# Patient Record
Sex: MALE | Race: WHITE | Employment: OTHER | URBAN - METROPOLITAN AREA
[De-identification: names, ages, dates, MRNs, and addresses within clinical notes are randomized per-mention and may not be internally consistent; named-entity substitution may affect disease eponyms.]

---

## 2018-05-03 ENCOUNTER — HOSPITAL ENCOUNTER (EMERGENCY)
Age: 68
Discharge: HOME | End: 2018-05-03
Attending: EMERGENCY MEDICINE
Payer: MEDICARE

## 2018-05-03 VITALS
OXYGEN SATURATION: 93 % | SYSTOLIC BLOOD PRESSURE: 136 MMHG | HEIGHT: 77 IN | TEMPERATURE: 98 F | DIASTOLIC BLOOD PRESSURE: 78 MMHG | BODY MASS INDEX: 25.98 KG/M2 | RESPIRATION RATE: 14 BRPM | HEART RATE: 106 BPM | WEIGHT: 220 LBS

## 2018-05-03 DIAGNOSIS — S39.012A BACK STRAIN, INITIAL ENCOUNTER: Primary | ICD-10-CM

## 2018-05-03 DIAGNOSIS — M54.32 BILATERAL SCIATICA: ICD-10-CM

## 2018-05-03 DIAGNOSIS — M54.31 BILATERAL SCIATICA: ICD-10-CM

## 2018-05-03 PROCEDURE — 99282 EMERGENCY DEPT VISIT SF MDM: CPT

## 2018-05-03 PROCEDURE — 96372 THER/PROPH/DIAG INJ SC/IM: CPT

## 2018-05-03 PROCEDURE — 6360000002 HC RX W HCPCS: Performed by: EMERGENCY MEDICINE

## 2018-05-03 RX ORDER — ONDANSETRON 4 MG/1
4 TABLET, ORALLY DISINTEGRATING ORAL ONCE
Status: COMPLETED | OUTPATIENT
Start: 2018-05-03 | End: 2018-05-03

## 2018-05-03 RX ADMIN — ONDANSETRON 4 MG: 4 TABLET, ORALLY DISINTEGRATING ORAL at 09:35

## 2018-05-03 RX ADMIN — HYDROMORPHONE HYDROCHLORIDE 1 MG: 1 INJECTION, SOLUTION INTRAMUSCULAR; INTRAVENOUS; SUBCUTANEOUS at 09:35

## 2018-05-03 ASSESSMENT — PAIN DESCRIPTION - ONSET: ONSET: ON-GOING

## 2018-05-03 ASSESSMENT — PAIN DESCRIPTION - ORIENTATION: ORIENTATION: RIGHT;LEFT

## 2018-05-03 ASSESSMENT — PAIN SCALES - GENERAL
PAINLEVEL_OUTOF10: 10
PAINLEVEL_OUTOF10: 10

## 2018-05-03 ASSESSMENT — PAIN DESCRIPTION - FREQUENCY: FREQUENCY: CONTINUOUS

## 2018-05-03 ASSESSMENT — PAIN DESCRIPTION - LOCATION: LOCATION: BACK;LEG

## 2018-05-03 ASSESSMENT — PAIN DESCRIPTION - PAIN TYPE: TYPE: ACUTE PAIN

## 2018-05-03 ASSESSMENT — PAIN DESCRIPTION - PROGRESSION: CLINICAL_PROGRESSION: NOT CHANGED

## 2018-05-03 ASSESSMENT — PAIN DESCRIPTION - DESCRIPTORS: DESCRIPTORS: BURNING

## 2018-05-07 ENCOUNTER — HOSPITAL ENCOUNTER (INPATIENT)
Age: 68
LOS: 1 days | Discharge: HOME | DRG: 300 | End: 2018-05-08
Attending: EMERGENCY MEDICINE | Admitting: INTERNAL MEDICINE
Payer: MEDICARE

## 2018-05-07 ENCOUNTER — APPOINTMENT (OUTPATIENT)
Dept: ULTRASOUND IMAGING | Age: 68
DRG: 300 | End: 2018-05-07
Payer: MEDICARE

## 2018-05-07 DIAGNOSIS — B02.29 POST HERPETIC NEURALGIA: ICD-10-CM

## 2018-05-07 DIAGNOSIS — D64.9 ANEMIA, UNSPECIFIED TYPE: ICD-10-CM

## 2018-05-07 DIAGNOSIS — M79.89 LEG SWELLING: ICD-10-CM

## 2018-05-07 DIAGNOSIS — M54.50 CHRONIC BILATERAL LOW BACK PAIN WITHOUT SCIATICA: ICD-10-CM

## 2018-05-07 DIAGNOSIS — L03.115 BILATERAL LOWER LEG CELLULITIS: Primary | ICD-10-CM

## 2018-05-07 DIAGNOSIS — G89.29 CHRONIC BILATERAL LOW BACK PAIN WITHOUT SCIATICA: ICD-10-CM

## 2018-05-07 DIAGNOSIS — L03.116 BILATERAL LOWER LEG CELLULITIS: Primary | ICD-10-CM

## 2018-05-07 PROBLEM — M50.30 DDD (DEGENERATIVE DISC DISEASE), CERVICAL: Chronic | Status: ACTIVE | Noted: 2018-05-07

## 2018-05-07 PROBLEM — I82.409 DVT OF LEG (DEEP VENOUS THROMBOSIS) (HCC): Status: ACTIVE | Noted: 2018-05-07

## 2018-05-07 LAB
ALBUMIN SERPL-MCNC: 3.5 G/DL (ref 3.5–5.2)
ALP BLD-CCNC: 73 U/L (ref 40–129)
ALT SERPL-CCNC: 13 U/L (ref 0–40)
AMPHETAMINE SCREEN, URINE: NOT DETECTED
ANION GAP SERPL CALCULATED.3IONS-SCNC: 12 MMOL/L (ref 7–16)
APTT: 29.7 SEC (ref 24.5–35.1)
AST SERPL-CCNC: 15 U/L (ref 0–39)
BARBITURATE SCREEN URINE: NOT DETECTED
BASOPHILS ABSOLUTE: 0.02 E9/L (ref 0–0.2)
BASOPHILS RELATIVE PERCENT: 0.3 % (ref 0–2)
BENZODIAZEPINE SCREEN, URINE: NOT DETECTED
BILIRUB SERPL-MCNC: 0.3 MG/DL (ref 0–1.2)
BUN BLDV-MCNC: 21 MG/DL (ref 8–23)
CALCIUM SERPL-MCNC: 9.3 MG/DL (ref 8.6–10.2)
CANNABINOID SCREEN URINE: NOT DETECTED
CHLORIDE BLD-SCNC: 101 MMOL/L (ref 98–107)
CO2: 23 MMOL/L (ref 22–29)
COCAINE METABOLITE SCREEN URINE: NOT DETECTED
CREAT SERPL-MCNC: 0.9 MG/DL (ref 0.7–1.2)
EOSINOPHILS ABSOLUTE: 0.18 E9/L (ref 0.05–0.5)
EOSINOPHILS RELATIVE PERCENT: 2.3 % (ref 0–6)
GFR AFRICAN AMERICAN: >60
GFR NON-AFRICAN AMERICAN: >60 ML/MIN/1.73
GLUCOSE BLD-MCNC: 127 MG/DL (ref 74–109)
HCT VFR BLD CALC: 30.8 % (ref 37–54)
HEMOGLOBIN: 10.4 G/DL (ref 12.5–16.5)
IMMATURE GRANULOCYTES #: 0.03 E9/L
IMMATURE GRANULOCYTES %: 0.4 % (ref 0–5)
INR BLD: 1
LACTIC ACID: 0.8 MMOL/L (ref 0.5–2.2)
LYMPHOCYTES ABSOLUTE: 1.44 E9/L (ref 1.5–4)
LYMPHOCYTES RELATIVE PERCENT: 18.8 % (ref 20–42)
MCH RBC QN AUTO: 33.9 PG (ref 26–35)
MCHC RBC AUTO-ENTMCNC: 33.8 % (ref 32–34.5)
MCV RBC AUTO: 100.3 FL (ref 80–99.9)
METHADONE SCREEN, URINE: NOT DETECTED
MONOCYTES ABSOLUTE: 0.93 E9/L (ref 0.1–0.95)
MONOCYTES RELATIVE PERCENT: 12.1 % (ref 2–12)
NEUTROPHILS ABSOLUTE: 5.06 E9/L (ref 1.8–7.3)
NEUTROPHILS RELATIVE PERCENT: 66.1 % (ref 43–80)
OPIATE SCREEN URINE: NOT DETECTED
PDW BLD-RTO: 12.4 FL (ref 11.5–15)
PHENCYCLIDINE SCREEN URINE: NOT DETECTED
PLATELET # BLD: 224 E9/L (ref 130–450)
PMV BLD AUTO: 9.5 FL (ref 7–12)
POTASSIUM SERPL-SCNC: 4 MMOL/L (ref 3.5–5)
PROPOXYPHENE SCREEN: NOT DETECTED
PROTHROMBIN TIME: 11.3 SEC (ref 9.3–12.4)
RBC # BLD: 3.07 E12/L (ref 3.8–5.8)
SODIUM BLD-SCNC: 136 MMOL/L (ref 132–146)
TOTAL PROTEIN: 6.2 G/DL (ref 6.4–8.3)
WBC # BLD: 7.7 E9/L (ref 4.5–11.5)

## 2018-05-07 PROCEDURE — G8988 SELF CARE GOAL STATUS: HCPCS

## 2018-05-07 PROCEDURE — 6360000002 HC RX W HCPCS: Performed by: EMERGENCY MEDICINE

## 2018-05-07 PROCEDURE — 85610 PROTHROMBIN TIME: CPT

## 2018-05-07 PROCEDURE — 80307 DRUG TEST PRSMV CHEM ANLYZR: CPT

## 2018-05-07 PROCEDURE — 83605 ASSAY OF LACTIC ACID: CPT

## 2018-05-07 PROCEDURE — 6370000000 HC RX 637 (ALT 250 FOR IP): Performed by: INTERNAL MEDICINE

## 2018-05-07 PROCEDURE — 2580000003 HC RX 258: Performed by: EMERGENCY MEDICINE

## 2018-05-07 PROCEDURE — 96366 THER/PROPH/DIAG IV INF ADDON: CPT

## 2018-05-07 PROCEDURE — 96375 TX/PRO/DX INJ NEW DRUG ADDON: CPT

## 2018-05-07 PROCEDURE — 99221 1ST HOSP IP/OBS SF/LOW 40: CPT | Performed by: SURGERY

## 2018-05-07 PROCEDURE — 87040 BLOOD CULTURE FOR BACTERIA: CPT

## 2018-05-07 PROCEDURE — 97161 PT EVAL LOW COMPLEX 20 MIN: CPT

## 2018-05-07 PROCEDURE — G8979 MOBILITY GOAL STATUS: HCPCS

## 2018-05-07 PROCEDURE — G0378 HOSPITAL OBSERVATION PER HR: HCPCS

## 2018-05-07 PROCEDURE — 99285 EMERGENCY DEPT VISIT HI MDM: CPT

## 2018-05-07 PROCEDURE — 2580000003 HC RX 258: Performed by: INTERNAL MEDICINE

## 2018-05-07 PROCEDURE — 93970 EXTREMITY STUDY: CPT

## 2018-05-07 PROCEDURE — 97530 THERAPEUTIC ACTIVITIES: CPT

## 2018-05-07 PROCEDURE — 85730 THROMBOPLASTIN TIME PARTIAL: CPT

## 2018-05-07 PROCEDURE — 80053 COMPREHEN METABOLIC PANEL: CPT

## 2018-05-07 PROCEDURE — 97165 OT EVAL LOW COMPLEX 30 MIN: CPT

## 2018-05-07 PROCEDURE — 96372 THER/PROPH/DIAG INJ SC/IM: CPT

## 2018-05-07 PROCEDURE — G8978 MOBILITY CURRENT STATUS: HCPCS

## 2018-05-07 PROCEDURE — 1200000000 HC SEMI PRIVATE

## 2018-05-07 PROCEDURE — 96365 THER/PROPH/DIAG IV INF INIT: CPT

## 2018-05-07 PROCEDURE — G8987 SELF CARE CURRENT STATUS: HCPCS

## 2018-05-07 PROCEDURE — 85025 COMPLETE CBC W/AUTO DIFF WBC: CPT

## 2018-05-07 PROCEDURE — 6360000002 HC RX W HCPCS: Performed by: INTERNAL MEDICINE

## 2018-05-07 RX ORDER — FENTANYL CITRATE 50 UG/ML
25 INJECTION, SOLUTION INTRAMUSCULAR; INTRAVENOUS ONCE
Status: COMPLETED | OUTPATIENT
Start: 2018-05-07 | End: 2018-05-07

## 2018-05-07 RX ORDER — GABAPENTIN 300 MG/1
300 CAPSULE ORAL 2 TIMES DAILY
Status: DISCONTINUED | OUTPATIENT
Start: 2018-05-07 | End: 2018-05-08 | Stop reason: HOSPADM

## 2018-05-07 RX ORDER — TRAMADOL HYDROCHLORIDE 50 MG/1
50 TABLET ORAL EVERY 6 HOURS PRN
Status: DISCONTINUED | OUTPATIENT
Start: 2018-05-07 | End: 2018-05-08 | Stop reason: HOSPADM

## 2018-05-07 RX ORDER — ACETAMINOPHEN 325 MG/1
650 TABLET ORAL EVERY 4 HOURS PRN
Status: DISCONTINUED | OUTPATIENT
Start: 2018-05-07 | End: 2018-05-08 | Stop reason: HOSPADM

## 2018-05-07 RX ORDER — FUROSEMIDE 20 MG/1
20 TABLET ORAL DAILY
COMMUNITY

## 2018-05-07 RX ORDER — ORPHENADRINE CITRATE 30 MG/ML
60 INJECTION INTRAMUSCULAR; INTRAVENOUS ONCE
Status: COMPLETED | OUTPATIENT
Start: 2018-05-07 | End: 2018-05-07

## 2018-05-07 RX ORDER — FUROSEMIDE 10 MG/ML
40 INJECTION INTRAMUSCULAR; INTRAVENOUS ONCE
Status: COMPLETED | OUTPATIENT
Start: 2018-05-07 | End: 2018-05-07

## 2018-05-07 RX ORDER — CLOTRIMAZOLE AND BETAMETHASONE DIPROPIONATE 10; .5 MG/ML; MG/ML
LOTION TOPICAL 2 TIMES DAILY
Status: DISCONTINUED | OUTPATIENT
Start: 2018-05-07 | End: 2018-05-08 | Stop reason: HOSPADM

## 2018-05-07 RX ORDER — SODIUM CHLORIDE 0.9 % (FLUSH) 0.9 %
10 SYRINGE (ML) INJECTION EVERY 12 HOURS SCHEDULED
Status: DISCONTINUED | OUTPATIENT
Start: 2018-05-07 | End: 2018-05-08 | Stop reason: HOSPADM

## 2018-05-07 RX ORDER — SODIUM CHLORIDE 0.9 % (FLUSH) 0.9 %
10 SYRINGE (ML) INJECTION PRN
Status: DISCONTINUED | OUTPATIENT
Start: 2018-05-07 | End: 2018-05-08 | Stop reason: HOSPADM

## 2018-05-07 RX ORDER — ACETAMINOPHEN 325 MG/1
650 TABLET ORAL 3 TIMES DAILY
COMMUNITY

## 2018-05-07 RX ORDER — CEPHALEXIN 500 MG/1
500 CAPSULE ORAL 4 TIMES DAILY
Status: ON HOLD | COMMUNITY
End: 2018-05-08

## 2018-05-07 RX ORDER — DEXAMETHASONE SODIUM PHOSPHATE 10 MG/ML
10 INJECTION INTRAMUSCULAR; INTRAVENOUS ONCE
Status: COMPLETED | OUTPATIENT
Start: 2018-05-07 | End: 2018-05-07

## 2018-05-07 RX ORDER — ONDANSETRON 2 MG/ML
4 INJECTION INTRAMUSCULAR; INTRAVENOUS EVERY 6 HOURS PRN
Status: DISCONTINUED | OUTPATIENT
Start: 2018-05-07 | End: 2018-05-08 | Stop reason: HOSPADM

## 2018-05-07 RX ORDER — KETOROLAC TROMETHAMINE 30 MG/ML
15 INJECTION, SOLUTION INTRAMUSCULAR; INTRAVENOUS ONCE
Status: COMPLETED | OUTPATIENT
Start: 2018-05-07 | End: 2018-05-07

## 2018-05-07 RX ORDER — GABAPENTIN 100 MG/1
100 CAPSULE ORAL 3 TIMES DAILY
Status: DISCONTINUED | OUTPATIENT
Start: 2018-05-07 | End: 2018-05-07

## 2018-05-07 RX ADMIN — DEXAMETHASONE SODIUM PHOSPHATE 10 MG: 10 INJECTION INTRAMUSCULAR; INTRAVENOUS at 02:15

## 2018-05-07 RX ADMIN — CLOTRIMAZOLE AND BETAMETHASONE DIPROPIONATE: 10; .5 LOTION TOPICAL at 21:09

## 2018-05-07 RX ADMIN — FUROSEMIDE 40 MG: 10 INJECTION, SOLUTION INTRAMUSCULAR; INTRAVENOUS at 02:15

## 2018-05-07 RX ADMIN — Medication 10 ML: at 21:07

## 2018-05-07 RX ADMIN — CLOTRIMAZOLE AND BETAMETHASONE DIPROPIONATE: 10; .5 LOTION TOPICAL at 11:12

## 2018-05-07 RX ADMIN — Medication 10 ML: at 11:12

## 2018-05-07 RX ADMIN — ORPHENADRINE CITRATE 60 MG: 30 INJECTION INTRAMUSCULAR; INTRAVENOUS at 02:14

## 2018-05-07 RX ADMIN — APIXABAN 10 MG: 5 TABLET, FILM COATED ORAL at 21:07

## 2018-05-07 RX ADMIN — KETOROLAC TROMETHAMINE 15 MG: 30 INJECTION, SOLUTION INTRAMUSCULAR at 02:15

## 2018-05-07 RX ADMIN — TRAMADOL HYDROCHLORIDE 50 MG: 50 TABLET, FILM COATED ORAL at 21:07

## 2018-05-07 RX ADMIN — WATER 2 G: 1 INJECTION INTRAMUSCULAR; INTRAVENOUS; SUBCUTANEOUS at 02:17

## 2018-05-07 RX ADMIN — GABAPENTIN 300 MG: 300 CAPSULE ORAL at 21:07

## 2018-05-07 RX ADMIN — FENTANYL CITRATE 25 MCG: 50 INJECTION, SOLUTION INTRAMUSCULAR; INTRAVENOUS at 02:14

## 2018-05-07 RX ADMIN — CEFAZOLIN SODIUM 2 G: 2 SOLUTION INTRAVENOUS at 18:59

## 2018-05-07 RX ADMIN — ENOXAPARIN SODIUM 40 MG: 40 INJECTION, SOLUTION INTRAVENOUS; SUBCUTANEOUS at 09:07

## 2018-05-07 RX ADMIN — TRAMADOL HYDROCHLORIDE 50 MG: 50 TABLET, FILM COATED ORAL at 11:14

## 2018-05-07 RX ADMIN — Medication 10 ML: at 09:07

## 2018-05-07 RX ADMIN — CEFAZOLIN SODIUM 2 G: 2 SOLUTION INTRAVENOUS at 11:10

## 2018-05-07 RX ADMIN — GABAPENTIN 100 MG: 100 CAPSULE ORAL at 09:07

## 2018-05-07 ASSESSMENT — PAIN DESCRIPTION - LOCATION
LOCATION: BACK

## 2018-05-07 ASSESSMENT — PAIN DESCRIPTION - FREQUENCY
FREQUENCY: CONTINUOUS

## 2018-05-07 ASSESSMENT — PAIN SCALES - GENERAL
PAINLEVEL_OUTOF10: 6
PAINLEVEL_OUTOF10: 10
PAINLEVEL_OUTOF10: 10
PAINLEVEL_OUTOF10: 3
PAINLEVEL_OUTOF10: 3
PAINLEVEL_OUTOF10: 5

## 2018-05-07 ASSESSMENT — PAIN DESCRIPTION - PROGRESSION
CLINICAL_PROGRESSION: GRADUALLY IMPROVING
CLINICAL_PROGRESSION: GRADUALLY WORSENING
CLINICAL_PROGRESSION: GRADUALLY WORSENING

## 2018-05-07 ASSESSMENT — PAIN DESCRIPTION - ONSET
ONSET: ON-GOING
ONSET: ON-GOING
ONSET: GRADUAL

## 2018-05-07 ASSESSMENT — PAIN DESCRIPTION - PAIN TYPE
TYPE: ACUTE PAIN

## 2018-05-07 ASSESSMENT — PAIN DESCRIPTION - DESCRIPTORS
DESCRIPTORS: ACHING;CONSTANT;DISCOMFORT
DESCRIPTORS: ACHING;CONSTANT;DISCOMFORT;SPASM
DESCRIPTORS: SHARP;BURNING

## 2018-05-08 VITALS
HEIGHT: 77 IN | OXYGEN SATURATION: 98 % | BODY MASS INDEX: 24.96 KG/M2 | RESPIRATION RATE: 16 BRPM | DIASTOLIC BLOOD PRESSURE: 56 MMHG | WEIGHT: 211.38 LBS | HEART RATE: 77 BPM | SYSTOLIC BLOOD PRESSURE: 101 MMHG | TEMPERATURE: 97.6 F

## 2018-05-08 LAB
ANION GAP SERPL CALCULATED.3IONS-SCNC: 13 MMOL/L (ref 7–16)
BUN BLDV-MCNC: 19 MG/DL (ref 8–23)
CALCIUM SERPL-MCNC: 9.6 MG/DL (ref 8.6–10.2)
CHLORIDE BLD-SCNC: 100 MMOL/L (ref 98–107)
CO2: 24 MMOL/L (ref 22–29)
CREAT SERPL-MCNC: 0.9 MG/DL (ref 0.7–1.2)
GFR AFRICAN AMERICAN: >60
GFR NON-AFRICAN AMERICAN: >60 ML/MIN/1.73
GLUCOSE BLD-MCNC: 104 MG/DL (ref 74–109)
HCT VFR BLD CALC: 33.1 % (ref 37–54)
HEMOGLOBIN: 11.1 G/DL (ref 12.5–16.5)
MCH RBC QN AUTO: 33.1 PG (ref 26–35)
MCHC RBC AUTO-ENTMCNC: 33.5 % (ref 32–34.5)
MCV RBC AUTO: 98.8 FL (ref 80–99.9)
PDW BLD-RTO: 12.5 FL (ref 11.5–15)
PLATELET # BLD: 250 E9/L (ref 130–450)
PMV BLD AUTO: 9.3 FL (ref 7–12)
POTASSIUM SERPL-SCNC: 3.8 MMOL/L (ref 3.5–5)
RBC # BLD: 3.35 E12/L (ref 3.8–5.8)
SODIUM BLD-SCNC: 137 MMOL/L (ref 132–146)
WBC # BLD: 8 E9/L (ref 4.5–11.5)

## 2018-05-08 PROCEDURE — 6360000002 HC RX W HCPCS: Performed by: INTERNAL MEDICINE

## 2018-05-08 PROCEDURE — 36415 COLL VENOUS BLD VENIPUNCTURE: CPT

## 2018-05-08 PROCEDURE — 2580000003 HC RX 258: Performed by: INTERNAL MEDICINE

## 2018-05-08 PROCEDURE — 6370000000 HC RX 637 (ALT 250 FOR IP): Performed by: INTERNAL MEDICINE

## 2018-05-08 PROCEDURE — 80048 BASIC METABOLIC PNL TOTAL CA: CPT

## 2018-05-08 PROCEDURE — 85027 COMPLETE CBC AUTOMATED: CPT

## 2018-05-08 PROCEDURE — 97530 THERAPEUTIC ACTIVITIES: CPT

## 2018-05-08 PROCEDURE — G0378 HOSPITAL OBSERVATION PER HR: HCPCS

## 2018-05-08 PROCEDURE — 96366 THER/PROPH/DIAG IV INF ADDON: CPT

## 2018-05-08 RX ORDER — GABAPENTIN 100 MG/1
300 CAPSULE ORAL 2 TIMES DAILY
Qty: 60 CAPSULE | Refills: 0
Start: 2018-05-08 | End: 2018-06-07

## 2018-05-08 RX ORDER — TRAMADOL HYDROCHLORIDE 50 MG/1
50 TABLET ORAL EVERY 8 HOURS PRN
Qty: 12 TABLET | Refills: 0 | Status: SHIPPED | OUTPATIENT
Start: 2018-05-08 | End: 2018-05-15

## 2018-05-08 RX ORDER — CEPHALEXIN 500 MG/1
500 CAPSULE ORAL 4 TIMES DAILY
Qty: 28 CAPSULE | Refills: 0 | Status: SHIPPED | OUTPATIENT
Start: 2018-05-08 | End: 2018-05-15

## 2018-05-08 RX ORDER — CLOTRIMAZOLE AND BETAMETHASONE DIPROPIONATE 10; .5 MG/ML; MG/ML
LOTION TOPICAL
Qty: 30 ML | Refills: 0 | Status: SHIPPED | OUTPATIENT
Start: 2018-05-08

## 2018-05-08 RX ADMIN — CEFAZOLIN SODIUM 2 G: 2 SOLUTION INTRAVENOUS at 02:47

## 2018-05-08 RX ADMIN — TRAMADOL HYDROCHLORIDE 50 MG: 50 TABLET, FILM COATED ORAL at 04:48

## 2018-05-08 RX ADMIN — APIXABAN 10 MG: 5 TABLET, FILM COATED ORAL at 09:02

## 2018-05-08 RX ADMIN — CEFAZOLIN SODIUM 2 G: 2 SOLUTION INTRAVENOUS at 10:09

## 2018-05-08 RX ADMIN — TRAMADOL HYDROCHLORIDE 50 MG: 50 TABLET, FILM COATED ORAL at 11:41

## 2018-05-08 RX ADMIN — GABAPENTIN 300 MG: 300 CAPSULE ORAL at 09:02

## 2018-05-08 RX ADMIN — Medication 10 ML: at 09:02

## 2018-05-08 ASSESSMENT — PAIN DESCRIPTION - DESCRIPTORS: DESCRIPTORS: ACHING;CONSTANT;SORE

## 2018-05-08 ASSESSMENT — PAIN DESCRIPTION - PAIN TYPE: TYPE: ACUTE PAIN

## 2018-05-08 ASSESSMENT — PAIN SCALES - GENERAL
PAINLEVEL_OUTOF10: 6
PAINLEVEL_OUTOF10: 6
PAINLEVEL_OUTOF10: 4

## 2018-05-08 ASSESSMENT — PAIN DESCRIPTION - PROGRESSION: CLINICAL_PROGRESSION: NOT CHANGED

## 2018-05-08 ASSESSMENT — PAIN DESCRIPTION - LOCATION: LOCATION: LEG

## 2018-05-08 ASSESSMENT — PAIN DESCRIPTION - ONSET: ONSET: ON-GOING

## 2018-05-08 ASSESSMENT — PAIN DESCRIPTION - FREQUENCY: FREQUENCY: CONTINUOUS

## 2018-05-12 LAB
BLOOD CULTURE, ROUTINE: NORMAL
CULTURE, BLOOD 2: NORMAL

## 2018-06-20 ENCOUNTER — TELEPHONE (OUTPATIENT)
Dept: VASCULAR SURGERY | Age: 68
End: 2018-06-20

## 2018-06-20 ENCOUNTER — OFFICE VISIT (OUTPATIENT)
Dept: VASCULAR SURGERY | Age: 68
End: 2018-06-20
Payer: MEDICARE

## 2018-06-20 VITALS — SYSTOLIC BLOOD PRESSURE: 120 MMHG | HEART RATE: 76 BPM | DIASTOLIC BLOOD PRESSURE: 64 MMHG

## 2018-06-20 DIAGNOSIS — I82.592 CHRONIC DEEP VEIN THROMBOSIS (DVT) OF OTHER VEIN OF LEFT LOWER EXTREMITY (HCC): Primary | ICD-10-CM

## 2018-06-20 PROCEDURE — 99213 OFFICE O/P EST LOW 20 MIN: CPT | Performed by: SURGERY

## 2018-06-20 RX ORDER — CLOBETASOL PROPIONATE 0.5 MG/G
CREAM TOPICAL 2 TIMES DAILY
COMMUNITY

## 2018-06-20 RX ORDER — TRAMADOL HYDROCHLORIDE 50 MG/1
50 TABLET ORAL PRN
Refills: 0 | COMMUNITY
Start: 2018-06-03

## 2018-07-26 ENCOUNTER — HOSPITAL ENCOUNTER (OUTPATIENT)
Dept: WOUND CARE | Age: 68
Discharge: HOME | End: 2018-07-26
Payer: MEDICARE

## 2018-08-02 ENCOUNTER — HOSPITAL ENCOUNTER (OUTPATIENT)
Dept: WOUND CARE | Age: 68
Discharge: HOME | End: 2018-08-02
Payer: MEDICARE

## 2018-08-14 ENCOUNTER — HOSPITAL ENCOUNTER (OUTPATIENT)
Dept: PHYSICAL THERAPY | Age: 68
Setting detail: THERAPIES SERIES
Discharge: HOME | End: 2018-08-14
Payer: MEDICARE

## 2018-08-14 PROCEDURE — 97161 PT EVAL LOW COMPLEX 20 MIN: CPT

## 2018-08-14 PROCEDURE — G8979 MOBILITY GOAL STATUS: HCPCS

## 2018-08-14 PROCEDURE — G8978 MOBILITY CURRENT STATUS: HCPCS

## 2018-08-15 PROCEDURE — G8978 MOBILITY CURRENT STATUS: HCPCS

## 2018-08-15 PROCEDURE — G8979 MOBILITY GOAL STATUS: HCPCS

## 2018-08-15 NOTE — PROGRESS NOTES
[address] [facility]                Phone: [phone]   Fax: [phone]    Physical Therapy Initial Evaluation  Date:  8/15/2018    Patient Name:  Amor Corona    :  1950  MRN: [id number]  Referring Physician:  Charlotte Lazo  Insurance Information:  Samir Stephens Medicare Advantage      Evaluation date: 18   Diagnosis:   Lumbago with R sciatica   Precautions:  L popliteal DVT, R chronic LE DVT  Cert Dates:  3/92/56-   ICD-10 Codes:  M54.9  M25.50 M25.60   Evaluating Physical Therapist:  Vera Rivers DPT      Subjective:  History/Onset of Symptoms:  Pt reports chronic back pain that has worsened over the last 3-4 months with no specific cause. Pt reports that he had an epidural approximately 1.5 weeks ago with some relief. Pt also reports recent x-ray and MRI at [facility]. Pt with history of BLE DVT as well. Pt normally ambulates with no AD but has been using cane intermittently for the last 2 months. Pt denies history of low back surgeries. Pain:  5-6/10 posterior bilateral thighs (hamstring) and described as aching   3-4/10 low back pain        Sensation:  Pt denies numbness and tingling to BLE     Previous methods of Treatment:  Epidural injection      Additional Comments:  Pt lives with his wife in a 1 story home with 1 step to enter and no rails. Better:  When on the move    Worse:  Bending forward   Rotating back    Walking for distance/ time    Pt reports that pain does not wake him up at night   Denies numbness and tingling to BLE   Tarik incontinence/ paraesthesia bladder    He does report slight increase in thigh pain with coughing     Pt also has history of L ZENOBIA 4 years ago       Objective:    Lumbar AROM:     Flexion: Mod loss  (pt reports pulling in posterior thighs bilaterally)   Extension:  Min loss   R sidebend:   Moderate movement loss   L sidebend:  Min movement loss     Scoliosis noted with L thoracic convexity and R convexity lumbar spine     SLR R 50* passively with increased tightness and pain in hamstring   SLR L 70* passively with increased tightness and pain in hamstring     Strength:     BLE grossly 4/5 throughout     Bed Mobility:  Modified independent     Transfers:  Modified independent      Gait:  Pt ambulates 100' with slight antalgic gait pattern with and no AD at modified independent level. Repeated tests:  No change in low back pain/ symptoms with slouch overcorrect, flexion, extension, and sidegliding. Pt's main complaints at this time is bilateral posterior thigh \"pulling\" and pain when hamstrings are being stretched. No directional preference identified on eval.  May be due to pt reporting decreased low back pain since epidural.            Summary/Assessment:    Pt presents to outpatient physical therapy with low back and bilateral posterior thigh pain, impaired ROM, BLE weakness, and impaired gait mechanics. Earl Norris PT G-Codes:    PT G-Codes  Functional Assessment Tool Used: professional judgement   Functional Limitation: Mobility: Walking and moving around  Mobility: Walking and Moving Around Current Status (): At least 20 percent but less than 40 percent impaired, limited or restricted  Mobility: Walking and Moving Around Goal Status (): At least 1 percent but less than 20 percent impaired, limited or restricted      Plan:     Below checked are areas for improvement during physical therapy POC:        [x]  Pain reduction  [x]  Balance Improvement       [x]  Strengthening  []  Postural Improvement   [x]  Range of Motion  []  Soft Tissue Improvement    [x]  Gait Training   []  Other:      [x]  Home Exercise Program      Pt will be see for 2 visits per week for 4-6 weeks for a total of 8-12 visits to accomplish goals set below:        Short Term/ Long Term Goals: (4-6 weeks)      1. Pt will report less than 2/10 low back and bilateral thigh pain during daily activities.       2.  Pt will increase BLE strength to at least 4+/5     3. Pt will increase lumbar spine AROM to at least min movement loss or better in all directions    4. Pt will increase passive SLR of BLE to at least 75*    5. Pt will ambulate >300' with no AD independently. 6.  Pt will be independent with HEP        Pt's potential for reaching Physical Therapy goals: fair . Time In:  1010  Time Out:  [address] [place], [place]  [id number]    Joyce Harrison  T: [phone]   F: [phone]     If you have any questions or concerns, please don't hesitate to call. Thank you for your referral.    Physician Signature:________________________________Date:__________________  By signing above, therapists plan is approved by physician. All patients under [employer]   must be signed by physician.

## 2018-08-20 ENCOUNTER — HOSPITAL ENCOUNTER (OUTPATIENT)
Dept: PHYSICAL THERAPY | Age: 68
Setting detail: THERAPIES SERIES
Discharge: HOME | End: 2018-08-20
Payer: MEDICARE

## 2018-08-20 PROCEDURE — 97110 THERAPEUTIC EXERCISES: CPT

## 2018-08-20 NOTE — PROGRESS NOTES
[address] [facility]                Phone: [phone]   Fax: [phone]    Physical Therapy Daily Treatment Note  Date:  2018    Patient Name:  Haja Joseph    :  1950  MRN: [id number]    Referring Physician:  Elysia Hinton  Insurance Information:  Robi Mckeon       Evaluation date: 18   Diagnosis:   Lumbago with R sciatica   Precautions:  L popliteal DVT, R chronic LE DVT  Cert Dates:  87-   ICD-10 Codes:  M54.9  M25.50 M25.60   Evaluating Physical Therapist:  Hakeem Tapia DPT  Plan of care signed (Y/N):    Visit# / total visits: -       Subjective:    Pt reports \"6/10\" bilateral posterior thigh pain currently. No other complaints at this time. Exercises:   Exercise/Equipment Reps  During/ after  Other comments    Prone lying  5 min  Decrease, better  Bilateral posterior thigh pain decreased from 6/10 to 3/10     Prone press-ups  3x10  Decrease, better  Bilateral posterior thigh pain decreased from 3/10 to 2/10     Standing repeated lumbar extensions 4x10 bolster  3x10 mat table   Decrease, better  Pt reports \"minimal\" pain in bilateral posterior thighs vs. Prior to session today. Home Exercise Program:    Repeated standing lumbar extensions every 4 hours   Red flag symptoms discussed with pt and he verbalized understanding    Comments:  Pt tolerated session well with decreased bilateral posterior thigh pain following repeated lumbar extensions today. Pain decreased from 6/10 to 2/10 by end of session. Still with significantly decreased hamstring flexibility and low back extension ROM. Continue lumbar extension progressions as tolerated next session to reduce bilateral thigh pain. Copy of MRI results in pt's hard chart.        Treatment/Activity Tolerance:  [x] Patient tolerated treatment well [] Patient limited by fatique  [] Patient limited by pain  [] Patient limited by other medical

## 2018-08-22 ENCOUNTER — HOSPITAL ENCOUNTER (OUTPATIENT)
Dept: PHYSICAL THERAPY | Age: 68
Setting detail: THERAPIES SERIES
Discharge: HOME | End: 2018-08-22
Payer: MEDICARE

## 2018-08-22 PROCEDURE — 97110 THERAPEUTIC EXERCISES: CPT

## 2018-08-28 ENCOUNTER — HOSPITAL ENCOUNTER (OUTPATIENT)
Dept: PHYSICAL THERAPY | Age: 68
Setting detail: THERAPIES SERIES
Discharge: HOME | End: 2018-08-28
Payer: MEDICARE

## 2018-08-28 PROCEDURE — 97110 THERAPEUTIC EXERCISES: CPT

## 2018-08-28 NOTE — PROGRESS NOTES
[address] [facility]                Phone: [phone]   Fax: [phone]    Physical Therapy Daily Treatment Note  Date:  2018    Patient Name:  Lilibeth Holder    :  1950  MRN: [id number]    Referring Physician:  Momo Tellez  Insurance Information:  Ofelia Mckeon       Evaluation date: 18   Diagnosis:   Lumbago with R sciatica   Precautions:  L popliteal DVT, R chronic LE DVT  Cert Dates:  57-   ICD-10 Codes:  M54.9  M25.50 M25.60   Evaluating Physical Therapist:  Carole Contreras DPT  Plan of care signed (Y/N):    Visit# / total visits: -       Subjective:    Pt reports that his low back is feeling \"better\" since last visit. He reports a \"65% improvement\" in low back pain since beginning therapy. He reports continuing to perform standing lumbar extensions but has not done as many sets the last few days due to not feeling well prior to having a tooth pulled. He does report that he is able to bend forward more and was also able to chip and putt yesterday without increased back pain. Still reports some \"stiffness\" in bilateral hamstrings but no \"pain\". Pt states that at worst, low back pain has been \"3/10\" since last visit. Exercises:   Exercise/Equipment Reps  During/ after  Other comments    Standing repeated lumbar extensions 5x10 bolster behind low back    3x10 mat table- NT  Decrease, better  Pt reports bilateral hamstring pain decreased from 6/10 to 3/10              NT Seated hamstring stretch  5x10 sec each leg   Slight \"soreness\" beginning in low back after bending forward to stretch        Home Exercise Program:    Repeated standing lumbar extensions every 4 hours   Red flag symptoms discussed with pt and he verbalized understanding    Comments:  Pt with decreased bilateral hamstring pain from 3/10 to 2/10 currently. He reports no back pain by end of session today.   Pt to continue with HEP as instructed and increased frequency to every 3 hours as able. Pt verbalized understanding. Also educated on performing repeated standing extensions prior to, during, and following his round of golf tomorrow to manage pain should is start. Pt again verbalized understanding. Treatment/Activity Tolerance:  [x] Patient tolerated treatment well [] Patient limited by fatique  [] Patient limited by pain  [] Patient limited by other medical complications  [] Other:     Prognosis: [] Good [x] Fair  [] Poor    Patient Requires Follow-up: [x] Yes  [] No    Plan:   [x] Continue per plan of care [] Alter current plan (see comments)  [] Plan of care initiated [] Hold pending MD visit [] Discharge    Plan for Next Session:    Check for response to continued HEP in regards to posterior leg pain and back pain. Monitor for compliance.        See Weekly Progress Note: []  Yes  [x]  No  Next due:        Time In: 1100  Time Out: 1130    Electronically signed by:    Rajni Rosales DPT  [id number]

## 2018-08-29 ENCOUNTER — APPOINTMENT (OUTPATIENT)
Dept: PHYSICAL THERAPY | Age: 68
End: 2018-08-29
Payer: MEDICARE

## 2018-09-05 ENCOUNTER — HOSPITAL ENCOUNTER (OUTPATIENT)
Dept: PHYSICAL THERAPY | Age: 68
Setting detail: THERAPIES SERIES
Discharge: HOME | End: 2018-09-05
Payer: MEDICARE

## 2018-09-05 NOTE — PROGRESS NOTES
[address] [facility]                Phone: [phone]  Fax: [phone]    Physical Therapy  Cancellation/No-show Note  Patient Name:  Lindsey Munoz  :  1950   Date:  2018    For today's appointment patient:  [x]  Cancelled  []  Rescheduled appointment  []  No-show     Reason given by patient:  []  Patient ill  []  Conflicting appointment  []  No transportation    []  Conflict with work  []  No reason given  [x]  Other: forgot about appointment today.            Electronically signed by:    Adela Esquivel DPT  [id number]

## 2018-09-12 ENCOUNTER — HOSPITAL ENCOUNTER (OUTPATIENT)
Dept: PHYSICAL THERAPY | Age: 68
Setting detail: THERAPIES SERIES
Discharge: HOME | End: 2018-09-12
Payer: MEDICARE

## 2018-09-12 ENCOUNTER — HOSPITAL ENCOUNTER (OUTPATIENT)
Dept: PHYSICAL THERAPY | Age: 68
Setting detail: THERAPIES SERIES
End: 2018-09-12
Payer: MEDICARE

## 2018-09-12 PROCEDURE — 97530 THERAPEUTIC ACTIVITIES: CPT

## 2018-09-12 PROCEDURE — 97110 THERAPEUTIC EXERCISES: CPT

## 2018-09-17 ENCOUNTER — HOSPITAL ENCOUNTER (OUTPATIENT)
Dept: PHYSICAL THERAPY | Age: 68
Setting detail: THERAPIES SERIES
Discharge: HOME | End: 2018-09-17
Payer: MEDICARE

## 2018-09-17 PROCEDURE — 97530 THERAPEUTIC ACTIVITIES: CPT

## 2018-09-17 PROCEDURE — 97110 THERAPEUTIC EXERCISES: CPT

## 2018-09-17 NOTE — PROGRESS NOTES
[address] [facility]                Phone: [phone]   Fax: [phone]    Physical Therapy Daily Treatment Note  Date:  2018    Patient Name:  Bird Naqvi    :  1950  MRN: [id number]    Referring Physician:  Joanna Diez  Insurance Information:  Delilah Mckeon       Evaluation date: 18   Diagnosis:   Lumbago with R sciatica   Precautions:  L popliteal DVT, R chronic LE DVT  Cert Dates:  -   ICD-10 Codes:  M54.9  M25.50 M25.60   Evaluating Physical Therapist:  Deion Cedeño DPT  Plan of care signed (Y/N):    Visit# / total visits: -       Subjective:    Pt reports that he was able to tailgate at the [employer] game without any significant low back pain. He reports doing a lot of walking. Performed gentle hamstring stretching and it was pulling on his low back. He does continue to perform standing lumbar extensions with good relief. Pt reports \"75-80% improvement\" since beginning PT. Exercises:   Exercise/Equipment Reps  During/ after  Other comments    Standing repeated lumbar extensions 5x10 mat table  No pain  No low back pain             NT Seated hamstring stretch  5x10 sec each leg   No pain in low back during, educated on avoiding rounding back during exercise       Home Exercise Program:    Repeated standing lumbar extensions every 4 hours   Red flag symptoms discussed with pt and he verbalized understanding    Comments:    BLE strength is 4+/5 throughout   Pt denies low back pain currently       Still with:   mod loss of lumbar flexion and R side bend         Min loss lumbar extension and L side bend     Passive SLR:   L:  70*   R:  50*    Pain located in hamstrings on both sides with SLR. Ambulating with no AD independently.   Takes cane with him at times in case he \"needs it\"      Reviewed HEP exercises with pt and he verbalized and demonstrated understanding        Treatment/Activity Tolerance:  [x] Patient

## 2018-09-17 NOTE — PROGRESS NOTES
[address] [facility]                Phone: [phone]   Fax: [phone]    To: Mela Medeiros       From: Aquiles Mcmahon     Patient: Jeremy Arvizu       : 1950  Diagnosis: Lumbago with R sciatica       Date: 2018      Discharge Note    Total Visits to date:   6 Cancels/No-shows to date:      Plan of Care/Treatment to date:  [x] Therapeutic Exercise    [] Modalities:  [x] Therapeutic Activity     [] Ultrasound   Electrical Stimulation  [] Gait Training      [] Cervical Traction   [] Lumbar Traction  [] Neuromuscular Re-education  [] Cold/hotpack [] Iontophoresis  [x] Instruction in HEP      Other:  [] Manual Therapy       []    [x] Postural education        []                    ? Significant Findings At Last Visit/Comments:    Pt reports that he was able to tailgate at the [employer] game without any significant low back pain. He reports doing a lot of walking. Performed gentle hamstring stretching and it was pulling on his low back. He does continue to perform standing lumbar extensions with good relief. Pt reports \"75-80% improvement\" since beginning PT. BLE strength is 4+/5 throughout   Pt denies low back pain currently        Still with:   mod loss of lumbar flexion and R side bend                    Min loss lumbar extension and L side bend      Passive SLR:              L:  70*              R:  50*               Pain located in hamstrings on both sides with SLR.       Ambulating with no AD independently. Takes cane with him at times in case he \"needs it\"       Reviewed HEP exercises with pt and he verbalized and demonstrated understanding     Pt with significant bilateral hamstring tightness. Aggressive stretching causing increase in low back pain. Instructed pt in gradual gentle stretching over time to try and improve hamstring flexibility while continuing lumbar extensions to prevent back pain from re-occurring.          Progress towards goals:

## 2018-09-20 ENCOUNTER — HOSPITAL ENCOUNTER (OUTPATIENT)
Dept: PHYSICAL THERAPY | Age: 68
Setting detail: THERAPIES SERIES
Discharge: HOME | End: 2018-09-20
Payer: MEDICARE

## 2018-09-20 PROCEDURE — G8979 MOBILITY GOAL STATUS: HCPCS

## 2018-09-20 PROCEDURE — G8980 MOBILITY D/C STATUS: HCPCS

## 2021-02-21 ENCOUNTER — IMMUNIZATION (OUTPATIENT)
Dept: PRIMARY CARE CLINIC | Age: 71
End: 2021-02-21
Payer: MEDICARE

## 2021-02-21 PROCEDURE — 0001A COVID-19, PFIZER VACCINE 30MCG/0.3ML DOSE: CPT | Performed by: NURSE PRACTITIONER

## 2021-02-21 PROCEDURE — 91300 COVID-19, PFIZER VACCINE 30MCG/0.3ML DOSE: CPT | Performed by: NURSE PRACTITIONER

## 2021-03-16 ENCOUNTER — IMMUNIZATION (OUTPATIENT)
Dept: PRIMARY CARE CLINIC | Age: 71
End: 2021-03-16
Payer: MEDICARE

## 2021-03-16 PROCEDURE — 91300 COVID-19, PFIZER VACCINE 30MCG/0.3ML DOSE: CPT | Performed by: NURSE PRACTITIONER

## 2021-03-16 PROCEDURE — 0002A COVID-19, PFIZER VACCINE 30MCG/0.3ML DOSE: CPT | Performed by: NURSE PRACTITIONER

## 2021-09-28 ENCOUNTER — IMMUNIZATION (OUTPATIENT)
Dept: PRIMARY CARE CLINIC | Age: 71
End: 2021-09-28
Payer: MEDICARE

## 2021-09-28 PROCEDURE — 0003A COVID-19, PFIZER VACCINE 30MCG/0.3ML DOSE: CPT | Performed by: FAMILY MEDICINE

## 2021-09-28 PROCEDURE — 91300 COVID-19, PFIZER VACCINE 30MCG/0.3ML DOSE: CPT | Performed by: FAMILY MEDICINE

## 2022-12-04 ENCOUNTER — HOSPITAL ENCOUNTER (EMERGENCY)
Age: 72
Discharge: HOME | End: 2022-12-04
Payer: MEDICARE

## 2022-12-04 VITALS
SYSTOLIC BLOOD PRESSURE: 122 MMHG | OXYGEN SATURATION: 100 % | TEMPERATURE: 97.2 F | DIASTOLIC BLOOD PRESSURE: 90 MMHG | HEIGHT: 77 IN | WEIGHT: 210 LBS | HEART RATE: 104 BPM | BODY MASS INDEX: 24.79 KG/M2 | RESPIRATION RATE: 18 BRPM

## 2022-12-04 DIAGNOSIS — Z23 TETANUS-DIPHTHERIA (TD) VACCINATION: Primary | ICD-10-CM
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
## 2022-12-04 DIAGNOSIS — T14.8XXA ABRASION: ICD-10-CM

## 2022-12-04 PROCEDURE — 6370000000 HC RX 637 (ALT 250 FOR IP): Performed by: PHYSICIAN ASSISTANT

## 2022-12-04 PROCEDURE — 90714 TD VACC NO PRESV 7 YRS+ IM: CPT | Performed by: PHYSICIAN ASSISTANT

## 2022-12-04 PROCEDURE — 99284 EMERGENCY DEPT VISIT MOD MDM: CPT

## 2022-12-04 PROCEDURE — 90471 IMMUNIZATION ADMIN: CPT | Performed by: PHYSICIAN ASSISTANT

## 2022-12-04 PROCEDURE — 6360000002 HC RX W HCPCS: Performed by: PHYSICIAN ASSISTANT

## 2022-12-04 RX ORDER — GINSENG 100 MG
CAPSULE ORAL ONCE
Status: COMPLETED | OUTPATIENT
Start: 2022-12-04 | End: 2022-12-04

## 2022-12-04 RX ORDER — DOXYCYCLINE HYCLATE 100 MG/1
100 CAPSULE ORAL ONCE
Status: COMPLETED | OUTPATIENT
Start: 2022-12-04 | End: 2022-12-04

## 2022-12-04 RX ORDER — TETANUS AND DIPHTHERIA TOXOIDS ADSORBED 2; 2 [LF]/.5ML; [LF]/.5ML
0.5 INJECTION INTRAMUSCULAR ONCE
Status: COMPLETED | OUTPATIENT
Start: 2022-12-04 | End: 2022-12-04

## 2022-12-04 RX ORDER — DOXYCYCLINE HYCLATE 100 MG
100 TABLET ORAL 2 TIMES DAILY
Qty: 14 TABLET | Refills: 0 | Status: SHIPPED | OUTPATIENT
Start: 2022-12-04 | End: 2022-12-11

## 2022-12-04 RX ORDER — BACITRACIN ZINC AND POLYMYXIN B SULFATE 500; 1000 [USP'U]/G; [USP'U]/G
OINTMENT TOPICAL
Qty: 30 G | Refills: 0 | Status: SHIPPED | OUTPATIENT
Start: 2022-12-04 | End: 2022-12-11

## 2022-12-04 RX ADMIN — TETANUS AND DIPHTHERIA TOXOIDS ADSORBED 0.5 ML: 2; 2 INJECTION INTRAMUSCULAR at 20:06

## 2022-12-04 RX ADMIN — DOXYCYCLINE HYCLATE 100 MG: 100 CAPSULE ORAL at 20:07

## 2022-12-04 RX ADMIN — BACITRACIN: 500 OINTMENT TOPICAL at 20:07

## 2022-12-04 ASSESSMENT — PAIN - FUNCTIONAL ASSESSMENT: PAIN_FUNCTIONAL_ASSESSMENT: NONE - DENIES PAIN

## 2022-12-05 NOTE — DISCHARGE INSTRUCTIONS
Please apply the ointment daily. Please finish your antibiotics. If you develop signs of infection such as redness, swelling, pus or worsening signs symptoms return back to the emergency department.   You are also given the name of a podiatrist.

## 2022-12-05 NOTE — ED PROVIDER NOTES
[address] [facility]  Department of Emergency Medicine   ED  Encounter Note  Admit Date/RoomTime: 2022  7:34 PM  ED Room: 32/32    NAME: Ewa Butts  : 1950  MRN: [id number]     Chief Complaint:  Animal Bite (Bit by a rat on L thumb and R great toe)    History of Present Illness        Ewa Butts is a 67 y.o. old male presenting to the emergency department by private vehicle, for a rodent bite to left volar pad of thumb and right great toe, with abrasions which occured 1 hour(s) prior to arrival.  Since onset the symptoms have been stable and persistent. Patient states he is living in his [facility] and the neighbor behind him is a hoarder. Patient states that there is some rats. Patient states that he was in his barefoot and got bit by 1. Patient states he called an ambulance and they evaluated it and felt that it was stable. Patient states Rosangela Lawton is here for a tetanus shot and the wound to be cleaned. Patient denies any pain or radiation of any pain. Patient states nothing seems to make anything better or worse. Patient denies any traumas or falls. Patient is not any blood thinners. Tetanus Status:  more than 10 years ago. Abnormal Behavior Witnessed:  No.           Geographic Location Where Bitten:  at home            Immunization Status of Animal:  animal is stray/wild    ROS   Pertinent positives and negatives are stated within HPI, all other systems reviewed and are negative. Past Medical History:  has a past medical history of Herniated disc, cervical.    Surgical History:  has a past surgical history that includes joint replacement. Social History:  reports that he has been smoking cigarettes. He has been smoking an average of .5 packs per day. He has never used smokeless tobacco. He reports current alcohol use. He reports that he does not use drugs. Family History: family history is not on file.      Allergies: Patient has no known allergies. Physical Exam   Oxygen Saturation Interpretation: Normal.        ED Triage Vitals [12/04/22 1920]   BP Temp Temp Source Heart Rate Resp SpO2 Height Weight   (!) 122/90 97.2 °F (36.2 °C) Tympanic (!) 104 18 100 % 6' 5\" (1.956 m) 210 lb (95.3 kg)       Constitutional:  Alert, development consistent with age. Neck:  Normal ROM. Supple. Lower Extremity(s): Right outer great toe and left volar portion of thumb             Tenderness:  mild. Swelling: None. Calf:  No evidence of DVT seen on physical exam. Negative Jose's sign. No cords or calf tenderness. No significant calf/ankle edema. .             Deformity: no deformity observed/palpated. ROM: full range of motion. Skin: Abrasions noted to pad of thumb no active bleeding. No need for suture repair. On right great toe on the lateral aspect there is an abrasion as well no active bleeding. Patient is full flexion extension  Good DP/PT pulse nothing for suture repair. Neurovascular: Motor deficit: none. Sensory deficit: none. Pulse deficit: none. Capillary refill: normal.  Gait:  normal.  Lymphatics: No lymphangitis or adenopathy noted. Neurological:  Oriented. Motor functions intact. Lab / Imaging Results   (All laboratory and radiology results have been personally reviewed by myself)  Labs:  No results found for this visit on 12/04/22. Imaging: All Radiology results interpreted by Radiologist unless otherwise noted.   No orders to display       ED Course / Medical Decision Making     Medications   diptheria-tetanus toxoids [facility]) 2-2 LF/0.5ML injection 0.5 mL (0.5 mLs IntraMUSCular Given 12/4/22 2006)   bacitracin ointment ( Topical Given 12/4/22 2007)   doxycycline hyclate (VIBRAMYCIN) capsule 100 mg (100 mg Oral Given 12/4/22 2007)        Consult(s):   None    Procedure(s):  There were no wounds requiring formal closure. Wound was thoroughly cleaned on both ends with wound cleanser as well as sterile water as well as Betadine and then repeated. Bacitracin applied. MDM:   Patient is a 60-year-old presenting for a rodent bite to his right great toe as well as his left volar portion of the pad of his finger. Patient denies any pain or radiation of any pain. No active bleeding. Patient has not done anything besides provide compression. Patient's tetanus is not up-to-date. Patient denies any head injury or loss of consciousness. Patient denies any signs of infection or compartment syndrome at this time. Patient had a thorough evaluation and wounds have no active bleeding. Patient has full flexion and extension of all extremities and digits. Good DP/PT pulse as well as good radial pulse. Good cap refills noted of both areas. No need for imaging. No concern for any acute fracture or dislocation. Wounds were thoroughly cleaned multiple times with wound cleanser Betadine and sterile dressings were applied with antibiotic ointment. First dose of doxycycline 100 mg orally was given to the patient here in the emergency department. Patient tolerated well. According to [facility] as well as up-to-date rats do not carry rabies. No need for rabies vaccination. Patient was advised of this. Patient was told to take the antibiotic in full 1 pill in the morning 1 pill at night for the next 7 days. Patient was told to change the dressings daily. Patient was advised of signs of infection including redness, swelling, pus and when to return back to the emergency department. Patient also given the name of a podiatrist due to the growth of his toenails. Patient was advised that this will help keep nail cleanliness. Patient was also told if he has any worsening symptoms always return back to the emergency department. Patient voiced understanding is agreeable with plan and management vitally stable.     Patient was explicitly instructed on specific signs and symptoms on which to return to the emergency room for. Patient was instructed to return to the ER for any new or worsening symptoms. Additional discharge instructions were given verbally. All questions were answered. Patient is comfortable and agreeable with discharge plan. Patient in no acute distress and non-toxic in appearance. Plan of Care/Counseling:  Lenny Tilley PA-C reviewed today's visit with the patient in addition to providing specific details for the plan of care and counseling regarding the diagnosis and prognosis. Questions are answered at this time and are agreeable with the plan. Assessment      1. Tetanus-diphtheria (Td) vaccination    2. Rat bite, initial encounter    3. Abrasion      Plan   Discharged home. Patient condition is stable    New Medications     New Prescriptions    BACITRACIN-POLYMYXIN B (POLYSPORIN) [id number] UNIT/GM OINTMENT    Apply topically 2 times daily. DOXYCYCLINE HYCLATE (VIBRA-TABS) 100 MG TABLET    Take 1 tablet by mouth 2 times daily for 7 days     Electronically signed by Lenny Tilley PA-C   DD: 12/4/22  **This report was transcribed using voice recognition software. Every effort was made to ensure accuracy; however, inadvertent computerized transcription errors may be present.   END OF ED PROVIDER NOTE      Lenny Tilley PA-C  12/04/22 2018

## 2025-04-28 ENCOUNTER — APPOINTMENT (OUTPATIENT)
Dept: GENERAL RADIOLOGY | Age: 75
End: 2025-04-28
Payer: MEDICARE

## 2025-04-28 ENCOUNTER — HOSPITAL ENCOUNTER (EMERGENCY)
Age: 75
Discharge: HOME | End: 2025-04-28
Attending: STUDENT IN AN ORGANIZED HEALTH CARE EDUCATION/TRAINING PROGRAM
Payer: MEDICARE

## 2025-04-28 VITALS
RESPIRATION RATE: 18 BRPM | HEART RATE: 84 BPM | BODY MASS INDEX: 21.25 KG/M2 | WEIGHT: 180 LBS | SYSTOLIC BLOOD PRESSURE: 149 MMHG | HEIGHT: 77 IN | DIASTOLIC BLOOD PRESSURE: 96 MMHG | OXYGEN SATURATION: 95 % | TEMPERATURE: 97.2 F

## 2025-04-28 DIAGNOSIS — S42.212A FX HUMERAL NECK, LEFT, CLOSED, INITIAL ENCOUNTER: Primary | ICD-10-CM

## 2025-04-28 PROCEDURE — 99283 EMERGENCY DEPT VISIT LOW MDM: CPT

## 2025-04-28 PROCEDURE — 6370000000 HC RX 637 (ALT 250 FOR IP): Performed by: STUDENT IN AN ORGANIZED HEALTH CARE EDUCATION/TRAINING PROGRAM

## 2025-04-28 PROCEDURE — 73030 X-RAY EXAM OF SHOULDER: CPT

## 2025-04-28 RX ORDER — OXYCODONE AND ACETAMINOPHEN 5; 325 MG/1; MG/1
1 TABLET ORAL EVERY 6 HOURS PRN
Qty: 12 TABLET | Refills: 0 | Status: SHIPPED | OUTPATIENT
Start: 2025-04-28 | End: 2025-05-01

## 2025-04-28 RX ORDER — HYDROCODONE BITARTRATE AND ACETAMINOPHEN 5; 325 MG/1; MG/1
1 TABLET ORAL ONCE
Status: DISCONTINUED | OUTPATIENT
Start: 2025-04-28 | End: 2025-04-28 | Stop reason: HOSPADM

## 2025-04-28 RX ORDER — IBUPROFEN 600 MG/1
600 TABLET, FILM COATED ORAL ONCE
Status: COMPLETED | OUTPATIENT
Start: 2025-04-28 | End: 2025-04-28

## 2025-04-28 RX ADMIN — IBUPROFEN 600 MG: 600 TABLET, FILM COATED ORAL at 10:42

## 2025-04-28 ASSESSMENT — LIFESTYLE VARIABLES
HOW OFTEN DO YOU HAVE A DRINK CONTAINING ALCOHOL: MONTHLY OR LESS
HOW MANY STANDARD DRINKS CONTAINING ALCOHOL DO YOU HAVE ON A TYPICAL DAY: 1 OR 2

## 2025-04-28 ASSESSMENT — PAIN - FUNCTIONAL ASSESSMENT: PAIN_FUNCTIONAL_ASSESSMENT: 0-10

## 2025-04-28 ASSESSMENT — PAIN SCALES - GENERAL
PAINLEVEL_OUTOF10: 6
PAINLEVEL_OUTOF10: 5

## 2025-04-28 ASSESSMENT — PAIN DESCRIPTION - ORIENTATION: ORIENTATION: LEFT

## 2025-04-28 ASSESSMENT — PAIN DESCRIPTION - LOCATION: LOCATION: SHOULDER

## 2025-04-28 NOTE — DISCHARGE INSTRUCTIONS
Please call and follow-up with family doctor as well as referral to orthopedic surgeon as discussed.  Return to emergency department if symptoms change or worsen or if you develop numbness or weakness of left arm.  Keep sling on as discussed

## 2025-04-28 NOTE — ED PROVIDER NOTES
[facility] EMERGENCY DEPARTMENT  EMERGENCY DEPARTMENT ENCOUNTER        Pt Name: Andrea Cameron  MRN: [id number]  Birthdate 1950  Date of evaluation: 4/28/2025  Provider: Mynor Brown DO  PCP: Favian Crawley Jr., DO  Note Started: 6:28 PM EDT 4/28/25    CHIEF COMPLAINT       Chief Complaint   Patient presents with    Shoulder Injury     Left, fell this morning, denies head injury or LOC       HISTORY OF PRESENT ILLNESS: 1 or more Elements   History received from: Patient    Andrea Cameron is a 74 y.o. male who presents to the emergency department with chief complaint of fall.  Patient states he was walking and tripped and fell and landed on his left shoulder.  He states that he started having significant pain in the left anterior shoulder with normal sensation of the elbow and hand but was not able to move the left shoulder because of severe pain.  States that he came to the emergency department because of this to have evaluation performed.  Denies hitting his head or hurting his neck and has no headache or neck pain.  Denies any loss of consciousness.  Does not take any anticoagulation.  Denies any other symptoms including chest pain, shortness of breath, abdominal pain, hematuria or dysuria, constipation or diarrhea, loss of control of bowels or bladder, or midline pain in his back.    Nursing Notes were all reviewed and agreed with or any disagreements were addressed in the HPI.    REVIEW OF SYSTEMS :    Positives and Pertinent negatives as per HPI.    PAST MEDICAL HISTORY/Chronic Conditions Affecting Care    has a past medical history of Herniated disc, cervical.     SURGICAL HISTORY     Past Surgical History:   Procedure Laterality Date    JOINT REPLACEMENT      left hip replacement, 2013       CURRENTMEDICATIONS       Discharge Medication List as of 4/28/2025 10:58 AM        CONTINUE these medications which have NOT CHANGED    Details   traMADol (ULTRAM) 50 MG tablet Take 50 mg

## 2025-04-29 ENCOUNTER — OFFICE VISIT (OUTPATIENT)
Dept: ORTHOPEDIC SURGERY | Age: 75
End: 2025-04-29
Payer: MEDICARE

## 2025-04-29 VITALS — SYSTOLIC BLOOD PRESSURE: 115 MMHG | TEMPERATURE: 97 F | DIASTOLIC BLOOD PRESSURE: 77 MMHG | HEART RATE: 98 BPM

## 2025-04-29 DIAGNOSIS — S42.202A CLOSED FRACTURE OF PROXIMAL END OF LEFT HUMERUS, UNSPECIFIED FRACTURE MORPHOLOGY, INITIAL ENCOUNTER: Primary | ICD-10-CM

## 2025-04-29 PROCEDURE — 1159F MED LIST DOCD IN RCRD: CPT | Performed by: PHYSICIAN ASSISTANT

## 2025-04-29 PROCEDURE — 1123F ACP DISCUSS/DSCN MKR DOCD: CPT | Performed by: PHYSICIAN ASSISTANT

## 2025-04-29 PROCEDURE — 99203 OFFICE O/P NEW LOW 30 MIN: CPT | Performed by: PHYSICIAN ASSISTANT

## 2025-04-29 RX ORDER — IBUPROFEN 800 MG/1
800 TABLET, FILM COATED ORAL 3 TIMES DAILY PRN
Qty: 90 TABLET | Refills: 0 | Status: SHIPPED | OUTPATIENT
Start: 2025-04-29

## 2025-04-29 NOTE — PROGRESS NOTES
New Patient Orthopaedic Progress Note    Andrea Cameron is a 74 y.o. male, his YOB: 1950 with the following history as recorded in [facility]:      Patient Active Problem List    Diagnosis Date Noted    Bilateral cellulitis of lower leg 05/07/2018    DVT of leg (deep venous thrombosis) ([facility]) 05/07/2018    DDD (degenerative disc disease), cervical 05/07/2018     Current Outpatient Medications   Medication Sig Dispense Refill    acetaminophen (TYLENOL) 325 MG tablet Take 2 tablets by mouth 3 times daily      oxyCODONE-acetaminophen (PERCOCET) 5-325 MG per tablet Take 1 tablet by mouth every 6 hours as needed for Pain for up to 3 days. Intended supply: 3 days. Take lowest dose possible to manage pain Max Daily Amount: 4 tablets (Patient not taking: Reported on 4/29/2025) 12 tablet 0    traMADol (ULTRAM) 50 MG tablet Take 50 mg by mouth as needed.. (Patient not taking: Reported on 4/29/2025)  0    clobetasol (TEMOVATE) 0.05 % cream Apply topically 2 times daily Apply topically 2 times daily.      gabapentin (NEURONTIN) 100 MG capsule Take 3 capsules by mouth 2 times daily for 30 days.. 60 capsule 0    clotrimazole-betamethasone (LOTRISONE) 1-0.05 % lotion Apply topically 2 times daily to legs and feet 30 mL 0    furosemide (LASIX) 20 MG tablet Take 20 mg by mouth daily (Patient not taking: Reported on 4/29/2025)      apixaban (ELIQUIS) 5 MG TABS tablet Take 2 tabs po bid for 6 days then 1 tab po bid (Patient not taking: Reported on 4/29/2025) 60 tablet 0     No current facility-administered medications for this visit.     Allergies: Patient has no known allergies.  Past Medical History:   Diagnosis Date    Herniated disc, cervical      Past Surgical History:   Procedure Laterality Date    JOINT REPLACEMENT      left hip replacement, 2013     No family history on file.  Social History     Tobacco Use    Smoking status: Every Day     Current packs/day: 0.50     Types: Cigarettes    Smokeless tobacco: Never

## 2025-04-29 NOTE — PATIENT INSTRUCTIONS
Nonweightbearing left upper extremity.    Okay to remove sling periodically for gentle range of motion exercises of the elbow, wrist and digits.     Follow-up in 1 week for reevaluation and x-rays.    Call if any questions or concerns

## 2025-05-02 ENCOUNTER — HOSPITAL ENCOUNTER (EMERGENCY)
Age: 75
Discharge: HOME | End: 2025-05-02
Attending: EMERGENCY MEDICINE
Payer: MEDICARE

## 2025-05-02 ENCOUNTER — APPOINTMENT (OUTPATIENT)
Dept: GENERAL RADIOLOGY | Age: 75
End: 2025-05-02
Payer: MEDICARE

## 2025-05-02 ENCOUNTER — APPOINTMENT (OUTPATIENT)
Dept: ULTRASOUND IMAGING | Age: 75
End: 2025-05-02
Payer: MEDICARE

## 2025-05-02 VITALS
SYSTOLIC BLOOD PRESSURE: 105 MMHG | TEMPERATURE: 97.7 F | HEART RATE: 91 BPM | OXYGEN SATURATION: 92 % | RESPIRATION RATE: 18 BRPM | DIASTOLIC BLOOD PRESSURE: 84 MMHG

## 2025-05-02 DIAGNOSIS — M79.89 FOREARM SWELLING: Primary | ICD-10-CM

## 2025-05-02 PROCEDURE — 99284 EMERGENCY DEPT VISIT MOD MDM: CPT

## 2025-05-02 PROCEDURE — 73110 X-RAY EXAM OF WRIST: CPT

## 2025-05-02 PROCEDURE — 6360000002 HC RX W HCPCS

## 2025-05-02 PROCEDURE — 93971 EXTREMITY STUDY: CPT

## 2025-05-02 PROCEDURE — 73130 X-RAY EXAM OF HAND: CPT

## 2025-05-02 RX ORDER — LIDOCAINE HYDROCHLORIDE 10 MG/ML
INJECTION, SOLUTION INFILTRATION; PERINEURAL
Status: COMPLETED
Start: 2025-05-02 | End: 2025-05-02

## 2025-05-02 RX ORDER — LIDOCAINE HYDROCHLORIDE 10 MG/ML
20 INJECTION, SOLUTION EPIDURAL; INFILTRATION; INTRACAUDAL; PERINEURAL ONCE
Status: DISCONTINUED | OUTPATIENT
Start: 2025-05-02 | End: 2025-05-02 | Stop reason: HOSPADM

## 2025-05-02 RX ADMIN — LIDOCAINE HYDROCHLORIDE 200 MG: 10 INJECTION, SOLUTION INFILTRATION; PERINEURAL at 20:43

## 2025-05-02 NOTE — ED NOTES
Department of Emergency Medicine  FIRST PROVIDER TRIAGE NOTE             Independent MLP           5/2/25  6:15 PM EDT    Date of Encounter: 5/2/25   MRN: [id number]      HPI: Andrea Cameron is a 74 y.o. male who presents to the ED for Wrist Pain (/) and Swelling (fall a weekago. swelling continued. began bleeding today. Palp radial pulses. )     NO HAND OR WRIST PAIN.  DOES NOT RECALL INJURING WRIST DURING THE FALL.     ROS: Negative for cp or sob.    PE: Gen Appearance/Constitutional: alert  HEENT: NC/NT. PERRLA,  Airway patent.    Provider-Patient relationship only established for Provider In Triage (PIT)  Full assessment, HPI and examination not performed, therefore, it is not yet possible to state whether or not an emergency medical condition exists   Focused assessment only during triage. This is not a comprehensive evaluation due to no physical ER space, staff shortage and high numbers of boarding patients in the ER.       Initial Plan of Care: All treatment areas with department are currently occupied. Plan to order/Initiate the following while awaiting opening in ED.  Initiate Treatment-Testing, Proceed toTreatment Area When Bed Available for ED Attending/MLP to Continue Care    Electronically signed by RALEIGH Cabezas CNP   DD: 5/2/25      Milind Trinh APRN - CNP  05/02/25 [id number]

## 2025-05-03 NOTE — ED PROVIDER NOTES
[facility] EMERGENCY DEPARTMENT  EMERGENCY DEPARTMENT ENCOUNTER      Pt Name: Andrea Cameron  MRN: [id number]  Birthdate 1950  Date of evaluation: 5/2/2025  Provider: Brady Morales MD     CHIEF COMPLAINT       Chief Complaint   Patient presents with    Wrist Pain           Swelling     fall a weekago. swelling continued. began bleeding today. Palp radial pulses.          HISTORY OF PRESENT ILLNESS   (Location/Symptom, Timing/Onset, Context/Setting, Quality, Duration, Modifying Factors, Severity) Note limiting factors.   I wore a kn95 mask for the entirety of this encounter.      HPI    Andrea Cameron is a 74 y.o. male who presents to the emergency department leaking from left wrist.  Patient had a fall left shoulder fracture last week.  He has had swelling and pain to the left forearm since then has a small wound that is bleeding.  No new pain in the wrist or the forearm    Nursing Notes were reviewed.    REVIEW OF SYSTEMS    (2+ for level 4; 10+ for level 5)   Review of Systems    PAST MEDICAL HISTORY     Past Medical History:   Diagnosis Date    Herniated disc, cervical        SURGICAL HISTORY       Past Surgical History:   Procedure Laterality Date    JOINT REPLACEMENT      left hip replacement, 2013       CURRENT MEDICATIONS       Previous Medications    ACETAMINOPHEN (TYLENOL) 325 MG TABLET    Take 2 tablets by mouth 3 times daily    APIXABAN (ELIQUIS) 5 MG TABS TABLET    Take 2 tabs po bid for 6 days then 1 tab po bid    CLOBETASOL (TEMOVATE) 0.05 % CREAM    Apply topically 2 times daily Apply topically 2 times daily.    CLOTRIMAZOLE-BETAMETHASONE (LOTRISONE) 1-0.05 % LOTION    Apply topically 2 times daily to legs and feet    FUROSEMIDE (LASIX) 20 MG TABLET    Take 20 mg by mouth daily    GABAPENTIN (NEURONTIN) 100 MG CAPSULE    Take 3 capsules by mouth 2 times daily for 30 days..    IBUPROFEN (ADVIL;MOTRIN) 800 MG TABLET    Take 1 tablet by mouth 3 times daily as needed for Pain  focal osseous lesion. No focal soft tissue abnormality.     No acute osseous abnormality.       ED BEDSIDE ULTRASOUND:   Performed by ED Physician - none    LABS:  Labs Reviewed - No data to display     All other labs were within normal range or not returned as of this dictation.    EMERGENCY DEPARTMENT COURSE and DIFFERENTIAL DIAGNOSIS/MDM:   Vitals:    Vitals:    05/02/25 1715 05/02/25 1818   BP:  105/84   Pulse: 82 91   Resp:  18   Temp:  97.7 °F (36.5 °C)   SpO2: 96% 92%       Medications   lidocaine 1 % injection (has no administration in time range)       MDM  .  Patient presenting with a leaking wound the left forearm ultrasound negative x-rays are reassuring and he does has large hematoma and dependent fluid spilled up now has a leaking wound I was able to get it dry apply Dermabond with good stasis of the drainage she stable for discharge    REVAL:         CRITICAL CARE TIME   Total Critical Care time was  minutes, excluding separately reportable procedures.  There was a high probability of clinically significant/life threatening deterioration in the patient's condition which required my urgent intervention.     CONSULTS:  None    PROCEDURES:  Unless otherwise noted below, none     Procedures    Patients symptoms are consistent with sepsis, severe sepsis, or septic shock (If yes use \".sepsiscoremeasure\"):  FINAL IMPRESSION    No diagnosis found.      DISPOSITION/PLAN   DISPOSITION                 PATIENT REFERRED TO:  No follow-up provider specified.    DISCHARGE MEDICATIONS:  New Prescriptions    No medications on file          (Please note:  Portions of this note were completed with a voice recognition program.  Efforts were made to edit the dictations but occasionally words and phrases are mis-transcribed.)  Form [id number].[id number]    Brady Morales MD (electronically signed)  Emergency Medicine Provider        Brady Morales MD  05/02/25 2042

## 2025-05-06 ENCOUNTER — OFFICE VISIT (OUTPATIENT)
Dept: ORTHOPEDIC SURGERY | Age: 75
End: 2025-05-06
Payer: MEDICARE

## 2025-05-06 VITALS
HEART RATE: 91 BPM | WEIGHT: 180 LBS | SYSTOLIC BLOOD PRESSURE: 131 MMHG | DIASTOLIC BLOOD PRESSURE: 80 MMHG | HEIGHT: 77 IN | BODY MASS INDEX: 21.25 KG/M2

## 2025-05-06 DIAGNOSIS — S42.202A CLOSED FRACTURE OF PROXIMAL END OF LEFT HUMERUS, UNSPECIFIED FRACTURE MORPHOLOGY, INITIAL ENCOUNTER: Primary | ICD-10-CM

## 2025-05-06 PROCEDURE — 99215 OFFICE O/P EST HI 40 MIN: CPT | Performed by: ORTHOPAEDIC SURGERY

## 2025-05-06 PROCEDURE — 1123F ACP DISCUSS/DSCN MKR DOCD: CPT | Performed by: ORTHOPAEDIC SURGERY

## 2025-05-06 NOTE — PATIENT INSTRUCTIONS
as instructed by your doctor or physical therapist. These exercises will help keep your muscles strong and your joints flexible while your bone is healing.     Wiggle your fingers or toes on the injured arm or leg often. This helps reduce swelling and stiffness.   Ice and elevation    Prop up the injured arm or leg on a pillow when you ice it or anytime you sit or lie down during the first 1 to 2 weeks after your surgery. Try to keep it above the level of your heart. This will help reduce swelling and pain.   Other instructions    If you have a cast or splint:  Keep it dry.  If you have a removable splint, ask your doctor if it is okay to take it off to bathe. Your doctor may want you to keep it on as much as possible. Be careful not to put the splint on too tight.  Do not stick objects such as pencils or coat hangers in your cast or splint to scratch your skin.  Do not put powder into your cast or splint to relieve itchy skin.  Never cut or alter your cast or splint.   Follow-up care is a key part of your treatment and safety. Be sure to make and go to all appointments, and call your doctor if you are having problems. It's also a good idea to know your test results and keep alist of the medicines you take.  When should you call for help?   Call 911 anytime you think you may need emergency care. For example, call if:    You passed out (lost consciousness).     You have severe trouble breathing.     You have sudden chest pain and shortness of breath, or you cough up blood.   Call your doctor now or seek immediate medical care if:    You have pain that does not get better after you take pain medicine.     Your fingers or toes on the injured arm or leg are cool, pale, or change color.     You have tingling or numbness in your fingers or toes.     You cannot move your fingers or toes.     Your cast or splint feels too tight.     The skin under your cast or splint is burning or stinging.     You have signs of infection,

## 2025-05-07 ENCOUNTER — TELEPHONE (OUTPATIENT)
Dept: ORTHOPEDIC SURGERY | Age: 75
End: 2025-05-07

## 2025-05-07 ENCOUNTER — PREP FOR PROCEDURE (OUTPATIENT)
Dept: ORTHOPEDIC SURGERY | Age: 75
End: 2025-05-07

## 2025-05-07 DIAGNOSIS — M79.89 FOREARM SWELLING: ICD-10-CM

## 2025-05-07 PROBLEM — S42.202A CLOSED FRACTURE OF PROXIMAL END OF LEFT HUMERUS: Status: ACTIVE | Noted: 2025-05-07

## 2025-05-07 RX ORDER — SODIUM CHLORIDE 9 MG/ML
INJECTION, SOLUTION INTRAVENOUS PRN
Status: CANCELLED | OUTPATIENT
Start: 2025-05-07

## 2025-05-07 RX ORDER — SODIUM CHLORIDE 0.9 % (FLUSH) 0.9 %
5-40 SYRINGE (ML) INJECTION PRN
Status: CANCELLED | OUTPATIENT
Start: 2025-05-07

## 2025-05-07 RX ORDER — SODIUM CHLORIDE 0.9 % (FLUSH) 0.9 %
5-40 SYRINGE (ML) INJECTION EVERY 12 HOURS SCHEDULED
Status: CANCELLED | OUTPATIENT
Start: 2025-05-07

## 2025-05-07 NOTE — TELEPHONE ENCOUNTER
Called and answered the patient's questions.  He is very hesitant to move forward with surgery.  He states he lost his wife 3 months ago and lives on his own.  He is considering trying occupational therapy prior to deciding if he would like to proceed with surgery.  Plan to have the patient follow-up on Tuesday as scheduled.  We will keep the patient on the surgery schedule on Thursday at this time until he discusses things further with Dr. Esquivel on Tuesday.  Will plan to have him as the last case at this time.  All questions answered.

## 2025-05-07 NOTE — TELEPHONE ENCOUNTER
Patient Name:  Andrea Cameron  Patient :  1950        DIAGNOSIS & PROCEDURE:                       Procedure/Operation: Left Shoulder, Reverse Total Shoulder Arthroplasty           Diagnosis:  Closed Fracture of Proximal End of Left Humerus, Forearm Swelling     Location:  Y    Surgeon:  Dr. Josias Esquivel MD    SCHEDULING INFORMATION:                          Date: 5-    Time: 7:00 am              Anesthesia:  General                                              Status: Should be Outpatient with Overnight Stay needed for OBS.   *Surgery was scheduled as Inpatient due to [facility] surgery scheduling change effective 2024*    Special Comments:  Medical Clearance requested from PCP prior to surgery.        Vendor: [employer]  [x]   Notified by MARY on 2025    Electronically signed by Liseth Abdi MA on 2025 at 10:31 AM

## 2025-05-07 NOTE — PROGRESS NOTES
15 for reverse left total shoulder arthroplasty for fracture    Return next Tuesday for any questions and preoperative consultation once again    Call with any questions concerns or worsening of symptoms      ELECTRONICALLY signed by:    Josias Esquivel MD  5/7/25    NOTE: This report was transcribed using voice recognition software. Every effort was made to ensure accuracy; however, inadvertent computerized transcription errors may be present

## 2025-05-07 NOTE — TELEPHONE ENCOUNTER
Returned call to patient after he left VM.  He stated he would like to cancel his surgery for 5/15/2025 and try OT.  He has scheduled an appt with OT alberto for 5/19/2025.  Informed patient I would forward message to Ortho Trauma office.

## 2025-05-07 NOTE — TELEPHONE ENCOUNTER
[facility] She spoke with the patient regarding his upcoming surgery with Dr. Esquivel on 5- for Left Shoulder, Reverse Total Shoulder Arthroplasty. Patient stated he left a voicemail with our office today asking to speak with Dr. Esquivel again because he is \"very hesitant about this surgery\". Patient was seen yesterday by Dr. Esquivel at the [place] office.     Patient is requesting a call back. Phone # [phone]  ( No Voicemail's were left on the surgery scheduling line from this patient).

## 2025-05-07 NOTE — H&P
Chief Complaint   Patient presents with    Follow-up       1 week f/u Left humerus fracture          SUBJECTIVE: Patient is a pleasant 74-year-old male who comes in today for evaluation of his left proximal humerus fracture.  He is 1 week follow-up from our office although he came in the day after he broke it on 4/28/2025 last week.  We went to see if gravity would help improve his alignment.  He is fairly adamant initially against surgery.  On his return visit today his shoulder has further displaced.  He is right-hand dominant this is his left proximal humerus.  I talked to he and his friend in detail about the fact that it has shifted.  I think that he would be a good candidate for reverse total shoulder arthroplasty for his proximal humerus fracture.  He did have to have an emergency room visit since his visit last week due to significant edema in his left arm due to inability to move the arm because of his fracture.  I talked him in detail about the surgery including the risk of death of anesthesia, infection, neurovascular damage, dislocation, perioperative fracture, infection requiring explantation, deep venous thrombosis, chronic pain, and any other unforeseeable complications.  In talking about this his wife has recently passed away in the hospital is very leery of hospitals.  I try to explain that there are real risk for surgery but I think that his arm would do much better with surgery but at the end of the day it is up to him.  We came to a compromise and that we will put him on the schedule for next Thursday for a reverse total shoulder arthroplasty.  I will also see him in the office next Tuesday to answer any questions he may have and he will talk to family in the meantime.  He is welcome to call me with any questions or concerns.  Most importantly I told him that the matter what he decides were happy to continue taking care of him and he understands this.              Past Medical History        Past

## 2025-05-08 NOTE — TELEPHONE ENCOUNTER
Message sent to [facility] Surgery Scheduling desk requesting for this surgery case to be changed to the 2nd surgery case on 5-.

## 2025-05-13 ENCOUNTER — TELEPHONE (OUTPATIENT)
Dept: ORTHOPEDIC SURGERY | Age: 75
End: 2025-05-13

## 2025-05-13 ENCOUNTER — OFFICE VISIT (OUTPATIENT)
Dept: ORTHOPEDIC SURGERY | Age: 75
End: 2025-05-13
Payer: MEDICARE

## 2025-05-13 VITALS
DIASTOLIC BLOOD PRESSURE: 67 MMHG | HEART RATE: 78 BPM | OXYGEN SATURATION: 98 % | SYSTOLIC BLOOD PRESSURE: 115 MMHG | TEMPERATURE: 97.2 F

## 2025-05-13 DIAGNOSIS — S42.202A CLOSED FRACTURE OF PROXIMAL END OF LEFT HUMERUS, UNSPECIFIED FRACTURE MORPHOLOGY, INITIAL ENCOUNTER: Primary | ICD-10-CM

## 2025-05-13 PROCEDURE — 1123F ACP DISCUSS/DSCN MKR DOCD: CPT | Performed by: ORTHOPAEDIC SURGERY

## 2025-05-13 PROCEDURE — 99213 OFFICE O/P EST LOW 20 MIN: CPT | Performed by: ORTHOPAEDIC SURGERY

## 2025-05-13 NOTE — TELEPHONE ENCOUNTER
Message sent to [facility] Surgery Scheduling Desk this morning. Patient has decided to cancel his surgery for 5- with Dr. Esquivel. He does not want to reschedule surgery at this time.   Dr. Esquivel notified of surgery cancellation.   OR  notified of surgery cancellation.

## 2025-05-13 NOTE — TELEPHONE ENCOUNTER
Patients sister Jade houston, states todays xray was not reviewed with patient. Would like a call from provider to review xray results

## 2025-05-14 NOTE — PROGRESS NOTES
Chief Complaint   Patient presents with    Follow-up     Left proximal humerus fx       SUBJECTIVE: Patient is a very pleasant 74-year-old male I been following for a left four-part proximal humerus fracture.  I have recommended reverse total shoulder arthroplasty.  As seen in previous notes the patient is very hesitant due to the fact that his wife did pass away in the hospital and he has very wary about the hospital and surgery in general.  He is scared about rehabilitation.  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  After long discussion he is adamant that he is not having surgery which I completely respect his wishes.  For that reason we will continue on with occupational therapy we will see him back in 2 weeks time to see how he is doing with starting range of motion.  He is agreeable with the plan.          Past Medical History:   Diagnosis Date    Herniated disc, cervical        Past Surgical History:   Procedure Laterality Date    JOINT REPLACEMENT      left hip replacement, 2013       No family history on file.    Social History     Tobacco Use    Smoking status: Every Day     Current packs/day: 0.50     Types: Cigarettes    Smokeless tobacco: Never   Vaping Use    Vaping status: Never Used   Substance Use Topics    Alcohol use: Yes     Alcohol/week: 0.0 standard drinks of alcohol     Comment: aprpoximately 4 beers daily    Drug use: No       No Known Allergies      Review of Systems -   General ROS: negative for - chills, fatigue, fever or night sweats  Respiratory ROS: no cough, shortness of breath, or wheezing  Cardiovascular ROS: no chest pain or dyspnea on exertion  Gastrointestinal ROS: no abdominal pain, change in bowel habits, or black or bloody

## 2025-05-14 NOTE — TELEPHONE ENCOUNTER
Po check appointment was not scheduled so we do not need to cancel the appointment.   The patient does have an appointment scheduled on 5- @ 10:45 am with Dr. Esquivel at the [place] office to discuss treatment options.

## 2025-05-20 ENCOUNTER — HOSPITAL ENCOUNTER (OUTPATIENT)
Dept: OCCUPATIONAL THERAPY | Age: 75
Setting detail: THERAPIES SERIES
Discharge: HOME | End: 2025-05-20
Attending: ORTHOPAEDIC SURGERY
Payer: MEDICARE

## 2025-05-20 PROCEDURE — 97165 OT EVAL LOW COMPLEX 30 MIN: CPT | Performed by: OCCUPATIONAL THERAPIST

## 2025-05-20 NOTE — PROGRESS NOTES
[x] Pain Management with/without modalities PRN  [x] Manual Therapy                      [] Splinting                      [] Scar Management                   [x] Joint Mobilization                    [x] Manual Edema Mobilization    [x] Myofascial Release                   [x] GM/FM Coordination       [x] Safety retraining/education per individual diagnosis/goals  [x] Neuromuscular Re-Ed            [] Energy Conservation/Work Simplification   []Joint Protection/Training          []Ergonomics     [x] Adaptive Equipment Assessment/Training                                        Patient Specific Goal: To be able to perform all necessary tasks needed to get through the day despite ROM gains in the shoulder.                             GOALS (Long term same as Short term):  1) Patient will demonstrate good understanding of home program (exercises/activities/diagnosis/prognosis/goals) with % compliance.   2) Patient will demonstrate increased active/passive range of motion of their L elbow extension to -15* or less to improve safety with functional reach.   3) Patient will demonstrate increased active/passive range of motion of their L shoulder by 20* or more in flex/abd/ER to improve ability to wash the back of his head.  4) Patient will demonstrate increased strength throughout L UE by increasing each affected MMG by a minimum of 1-2 grades to improve daily functional performance with IADL tasks.  5) Patient to report decreased pain in their affected L upper extremity from moderate to mild or less with resistive functional use/PROM.   6) Patient will be knowledgeable of edema control techniques as evident with decreases from moderate to mild/none in the proximal L UE.   7) Patient will demonstrate/report proper assimilation of compensatory techniques to complete ADLs and IADLs.    Past Medical History:   Past Medical History:   Diagnosis Date    Herniated disc, cervical      Past Surgical History:   Past

## 2025-05-21 ENCOUNTER — HOSPITAL ENCOUNTER (OUTPATIENT)
Dept: OCCUPATIONAL THERAPY | Age: 75
Setting detail: THERAPIES SERIES
Discharge: HOME | End: 2025-05-21
Attending: ORTHOPAEDIC SURGERY
Payer: MEDICARE

## 2025-05-21 PROCEDURE — 97110 THERAPEUTIC EXERCISES: CPT

## 2025-05-21 PROCEDURE — 97140 MANUAL THERAPY 1/> REGIONS: CPT

## 2025-05-21 NOTE — PROGRESS NOTES
OCCUPATIONAL THERAPY DAILY NOTE  [facility]  [facility]  [address] [address]  Dept: [phone]  Loc: [phone]   [facility] OT Fax: [phone]      Date:  2025  Initial Evaluation Date: 25   Evaluating Therapist: Annia Hinojosa OT    Patient Name:  Andrea Cameron    :  1950      TODAY'S TREATMENT     Pain Level: Pt reports pain in anterior shoulder AC Joint region with light activity, sore    Subjective: Pt reports stiffness in elbow and abnormal posturing is from previously wearing sling      Objective:    Updated POC to be completed by 10th visit.    INTERVENTION: COMPLETED: SPECIFICS/COMMENTS:   Modality:     MH X  -To L shoulder/elbow x 10 min while engaging in distal exercise to promote tissue extensibility         AROM/AAROM:     L shoulder X     X   X  -Pendulums 2x10 CW, CCW with Max cueing throughout for proper technique  -Shoulder/scap retraction AA 2x10  -Gentle AA ER with arm adducted and blocked 2x10   L Elbow X       X     X  -Seated elbow flexion & extension AROM & AAROM with shoulder blocked/stabilized and forearm neutral & sup 2x15, 3 sec holds  -AROM Elbow flex/ext (emphasis on extension) towel rolled behind humerus 2x10, 3 sec holds in ext  -AAROM dowel elbow flex/ext 2x10   L Wrist/Forearm X   X   X  -AA supination x15  -AA sup/pro stick x20  -AROM forearm/wrist all planes x 20   PROM/Stretching:     L Elbow X  -With arm adducted: elbow extension   L Forearm X  -Forearm supination prolonged holds   Scar Mass/Edema Control:     L Elbow X  -MFR to bicep region        Strengthening:               Other:     HEP X  -Rolled towel behind humerus elbow extension neutral and supinated 2x10, 3 sec holds, 4x/day          Assessment/Comments: Pt arrives to session demonstrating maladaptive posturing shoulder IR and elbow flexed. Training/edu throughout session to promote elbow extension and shoulder ER during mobility. Elbow extension is

## 2025-05-27 ENCOUNTER — HOSPITAL ENCOUNTER (OUTPATIENT)
Dept: OCCUPATIONAL THERAPY | Age: 75
Setting detail: THERAPIES SERIES
Discharge: HOME | End: 2025-05-27
Attending: ORTHOPAEDIC SURGERY
Payer: MEDICARE

## 2025-05-27 PROCEDURE — 97140 MANUAL THERAPY 1/> REGIONS: CPT

## 2025-05-27 PROCEDURE — 97110 THERAPEUTIC EXERCISES: CPT

## 2025-05-27 NOTE — PROGRESS NOTES
OCCUPATIONAL THERAPY DAILY NOTE  [facility]  [facility]  [address] [address]  Dept: [phone]  Loc: [phone]   [facility] OT Fax: [phone]      Date:  2025        Initial Evaluation Date: 2025                            Evaluating Therapist: Gwendolyn Starr OT     Patient Name:  Andrea Cameron                      :  1950     Restrictions/Precautions:  Per Proximal Humerus Fx Protocol - conservative, low fall risk  Diagnosis:  L Proximal Humerus Fx; S42.A (ICD-10-CM) - Closed fracture of proximal end of left humerus, unspecified fracture morphology, initial encounter   Date of Surgery/Injury: 2025 inj 4 weeks post inj      Insurance/Certification information:  [employer]  Plan of care signed (Y/N): N  Visit# / total visits: 3 / 18     Referring Practitioner:  Dr. Josias Esquivel MD  Specific Practitioner Orders: Patient with left proximal humerus fracture. Patient with significant edema in left forearm. Work on edema control, gentle range of motion elbow wrist and fingers.  Modalities as needed.      Assessment of current deficits   [x] ADLs          [x] IADLs         [x] Strength      [x] ROM          [x] Pain            [x] Sensation     [x] Fine Motor Coordination    [x] Edema        [x] Motor Endurance [x] Gross Motor Coordination      OT PLAN OF CARE   OT POC based on physician orders, patient diagnosis and results of clinical assessment.     Frequency/Duration: 2-3x/wk for 18 visits  /  Certification Period From: 2025  To: 2025     Specific OT Treatment to include:   [x] Instruction in HEP                   Modalities:  [x] Therapeutic Exercise                 [x] Ultrasound             [x] PROM/Stretching                    [x] Fluidotherapy          [x]  Paraffin                   [x] AAROM  [x] AROM                 [x] Iontophoresis:   [x] Tendon Glides                         [x] Electrical Stimulation/Attended

## 2025-05-29 ENCOUNTER — HOSPITAL ENCOUNTER (OUTPATIENT)
Dept: OCCUPATIONAL THERAPY | Age: 75
Setting detail: THERAPIES SERIES
Discharge: HOME | End: 2025-05-29
Attending: ORTHOPAEDIC SURGERY
Payer: MEDICARE

## 2025-05-29 PROCEDURE — 97140 MANUAL THERAPY 1/> REGIONS: CPT

## 2025-05-29 PROCEDURE — 97110 THERAPEUTIC EXERCISES: CPT

## 2025-05-29 NOTE — PROGRESS NOTES
OCCUPATIONAL THERAPY DAILY NOTE  Y [facility]  JODIE OCCUPATIONAL THERAPY  [address] [facility] [address]  Dept: [phone]  Loc: [phone]   [facility] OT Fax: [phone]      Date:  2025        Initial Evaluation Date: 2025                            Evaluating Therapist: Gwendolyn Starr OT     Patient Name:  Andrea Cameron                      :  1950     Restrictions/Precautions:  Per Proximal Humerus Fx Protocol - conservative, low fall risk  Diagnosis:  L Proximal Humerus Fx; S42.A (ICD-10-CM) - Closed fracture of proximal end of left humerus, unspecified fracture morphology, initial encounter   Date of Surgery/Injury: 2025 inj 4 weeks post inj      Insurance/Certification information:  [employer] Medicare  Plan of care signed (Y/N): N  Visit# / total visits:      Referring Practitioner:  Dr. Josias Esquivel MD  Specific Practitioner Orders: Patient with left proximal humerus fracture. Patient with significant edema in left forearm. Work on edema control, gentle range of motion elbow wrist and fingers.  Modalities as needed.      Assessment of current deficits   [x] ADLs          [x] IADLs         [x] Strength      [x] ROM          [x] Pain            [x] Sensation     [x] Fine Motor Coordination    [x] Edema        [x] Motor Endurance [x] Gross Motor Coordination      OT PLAN OF CARE   OT POC based on physician orders, patient diagnosis and results of clinical assessment.     Frequency/Duration: 2-3x/wk for 18 visits  /  Certification Period From: 2025  To: 2025     Specific OT Treatment to include:   [x] Instruction in HEP                   Modalities:  [x] Therapeutic Exercise                 [x] Ultrasound             [x] PROM/Stretching                    [x] Fluidotherapy          [x]  Paraffin                   [x] AAROM  [x] AROM                 [x] Iontophoresis:   [x] Tendon Glides                         [x] Electrical Stimulation/Attended

## 2025-06-03 ENCOUNTER — HOSPITAL ENCOUNTER (OUTPATIENT)
Dept: OCCUPATIONAL THERAPY | Age: 75
Setting detail: THERAPIES SERIES
Discharge: HOME | End: 2025-06-03
Attending: ORTHOPAEDIC SURGERY
Payer: MEDICARE

## 2025-06-03 PROCEDURE — 97110 THERAPEUTIC EXERCISES: CPT

## 2025-06-03 PROCEDURE — 97140 MANUAL THERAPY 1/> REGIONS: CPT

## 2025-06-03 NOTE — PROGRESS NOTES
OCCUPATIONAL THERAPY DAILY NOTE  Y [facility]  JODIE OCCUPATIONAL THERAPY  [address] [facility] [address]  Dept: [phone]  Loc: [phone]   [facility] OT Fax: [phone]      Date:  6/3/2025        Initial Evaluation Date: 2025                            Evaluating Therapist: Gwendolyn Starr OT     Patient Name:  Andrea Cameron                      :  1950     Restrictions/Precautions:  Per Proximal Humerus Fx Protocol - conservative, low fall risk  Diagnosis:  L Proximal Humerus Fx; S42.A (ICD-10-CM) - Closed fracture of proximal end of left humerus, unspecified fracture morphology, initial encounter   Date of Surgery/Injury: 2025 inj 4 weeks post inj      Insurance/Certification information:  [employer] Medicare  Plan of care signed (Y/N): N  Visit# / total visits:      Referring Practitioner:  Dr. Josias Esquivel MD  Specific Practitioner Orders: Patient with left proximal humerus fracture. Patient with significant edema in left forearm. Work on edema control, gentle range of motion elbow wrist and fingers.  Modalities as needed.      Assessment of current deficits   [x] ADLs          [x] IADLs         [x] Strength      [x] ROM          [x] Pain            [x] Sensation     [x] Fine Motor Coordination    [x] Edema        [x] Motor Endurance [x] Gross Motor Coordination      OT PLAN OF CARE   OT POC based on physician orders, patient diagnosis and results of clinical assessment.     Frequency/Duration: 2-3x/wk for 18 visits  /  Certification Period From: 2025  To: 2025     Specific OT Treatment to include:   [x] Instruction in HEP                   Modalities:  [x] Therapeutic Exercise                 [x] Ultrasound             [x] PROM/Stretching                    [x] Fluidotherapy          [x]  Paraffin                   [x] AAROM  [x] AROM                 [x] Iontophoresis:   [x] Tendon Glides                         [x] Electrical Stimulation/Attended

## 2025-06-05 ENCOUNTER — HOSPITAL ENCOUNTER (OUTPATIENT)
Dept: OCCUPATIONAL THERAPY | Age: 75
Setting detail: THERAPIES SERIES
Discharge: HOME | End: 2025-06-05
Attending: ORTHOPAEDIC SURGERY
Payer: MEDICARE

## 2025-06-05 PROCEDURE — 97140 MANUAL THERAPY 1/> REGIONS: CPT

## 2025-06-05 PROCEDURE — 97110 THERAPEUTIC EXERCISES: CPT

## 2025-06-05 NOTE — PROGRESS NOTES
OCCUPATIONAL THERAPY DAILY NOTE  Y [facility]  JODIE OCCUPATIONAL THERAPY  [address] [facility] [address]  Dept: [phone]  Loc: [phone]   [facility] OT Fax: [phone]      Date:  2025        Initial Evaluation Date: 2025                            Evaluating Therapist: Gwendolyn Starr OT     Patient Name:  Andrea Cameron                      :  1950     Restrictions/Precautions:  Per Proximal Humerus Fx Protocol - conservative, low fall risk  Diagnosis:  L Proximal Humerus Fx; S42.A (ICD-10-CM) - Closed fracture of proximal end of left humerus, unspecified fracture morphology, initial encounter   Date of Surgery/Injury: 2025 inj 4 weeks post inj      Insurance/Certification information:  [employer] Medicare  Plan of care signed (Y/N): N  Visit# / total visits:      Referring Practitioner:  Dr. Josias Esquivel MD  Specific Practitioner Orders: Patient with left proximal humerus fracture. Patient with significant edema in left forearm. Work on edema control, gentle range of motion elbow wrist and fingers.  Modalities as needed.      Assessment of current deficits   [x] ADLs          [x] IADLs         [x] Strength      [x] ROM          [x] Pain            [x] Sensation     [x] Fine Motor Coordination    [x] Edema        [x] Motor Endurance [x] Gross Motor Coordination      OT PLAN OF CARE   OT POC based on physician orders, patient diagnosis and results of clinical assessment.     Frequency/Duration: 2-3x/wk for 18 visits  /  Certification Period From: 2025  To: 2025     Specific OT Treatment to include:   [x] Instruction in HEP                   Modalities:  [x] Therapeutic Exercise                 [x] Ultrasound             [x] PROM/Stretching                    [x] Fluidotherapy          [x]  Paraffin                   [x] AAROM  [x] AROM                 [x] Iontophoresis:   [x] Tendon Glides                         [x] Electrical Stimulation/Attended

## 2025-06-10 ENCOUNTER — HOSPITAL ENCOUNTER (OUTPATIENT)
Dept: OCCUPATIONAL THERAPY | Age: 75
Setting detail: THERAPIES SERIES
Discharge: HOME | End: 2025-06-10
Attending: ORTHOPAEDIC SURGERY
Payer: MEDICARE

## 2025-06-10 ENCOUNTER — OFFICE VISIT (OUTPATIENT)
Dept: ORTHOPEDIC SURGERY | Age: 75
End: 2025-06-10
Payer: MEDICARE

## 2025-06-10 VITALS
HEART RATE: 92 BPM | OXYGEN SATURATION: 97 % | RESPIRATION RATE: 16 BRPM | DIASTOLIC BLOOD PRESSURE: 89 MMHG | TEMPERATURE: 98.2 F | SYSTOLIC BLOOD PRESSURE: 163 MMHG

## 2025-06-10 DIAGNOSIS — S42.202A CLOSED FRACTURE OF PROXIMAL END OF LEFT HUMERUS, UNSPECIFIED FRACTURE MORPHOLOGY, INITIAL ENCOUNTER: Primary | ICD-10-CM

## 2025-06-10 PROCEDURE — 97110 THERAPEUTIC EXERCISES: CPT | Performed by: OCCUPATIONAL THERAPIST

## 2025-06-10 PROCEDURE — 1126F AMNT PAIN NOTED NONE PRSNT: CPT | Performed by: PHYSICIAN ASSISTANT

## 2025-06-10 PROCEDURE — 1123F ACP DISCUSS/DSCN MKR DOCD: CPT | Performed by: PHYSICIAN ASSISTANT

## 2025-06-10 PROCEDURE — 99213 OFFICE O/P EST LOW 20 MIN: CPT | Performed by: PHYSICIAN ASSISTANT

## 2025-06-10 PROCEDURE — 1160F RVW MEDS BY RX/DR IN RCRD: CPT | Performed by: PHYSICIAN ASSISTANT

## 2025-06-10 PROCEDURE — 97140 MANUAL THERAPY 1/> REGIONS: CPT | Performed by: OCCUPATIONAL THERAPIST

## 2025-06-10 PROCEDURE — 1159F MED LIST DOCD IN RCRD: CPT | Performed by: PHYSICIAN ASSISTANT

## 2025-06-10 NOTE — PROGRESS NOTES
Chief Complaint   Patient presents with    Follow-up     LT Humerus Fx          Andrea Cameron is a 75 y.o. year old  who presents for follow up of a 4 part left proximal humerus fracture. He is now about 6 weeks s/p injury. Dr. Esquivel has recommended a left reverse total shoulder arthroplasty. Patient has continued to decline surgical management. He has been working in therapy. Pain is controlled. Overall he is happy with his response.       Past Medical History:   Diagnosis Date    Herniated disc, cervical      Past Surgical History:   Procedure Laterality Date    JOINT REPLACEMENT      left hip replacement, 2013       Current Outpatient Medications:     ibuprofen (ADVIL;MOTRIN) 800 MG tablet, Take 1 tablet by mouth 3 times daily as needed for Pain, Disp: 90 tablet, Rfl: 0    traMADol (ULTRAM) 50 MG tablet, Take 1 tablet by mouth as needed., Disp: , Rfl: 0    clobetasol (TEMOVATE) 0.05 % cream, Apply topically 2 times daily Apply topically 2 times daily., Disp: , Rfl:     clotrimazole-betamethasone (LOTRISONE) 1-0.05 % lotion, Apply topically 2 times daily to legs and feet, Disp: 30 mL, Rfl: 0    furosemide (LASIX) 20 MG tablet, Take 1 tablet by mouth daily, Disp: , Rfl:     acetaminophen (TYLENOL) 325 MG tablet, Take 2 tablets by mouth 3 times daily, Disp: , Rfl:     apixaban (ELIQUIS) 5 MG TABS tablet, Take 2 tabs po bid for 6 days then 1 tab po bid, Disp: 60 tablet, Rfl: 0    gabapentin (NEURONTIN) 100 MG capsule, Take 3 capsules by mouth 2 times daily for 30 days.., Disp: 60 capsule, Rfl: 0  No Known Allergies  Social History     Socioeconomic History    Marital status: Life Partner     Spouse name: Not on file    Number of children: Not on file    Years of education: Not on file    Highest education level: Not on file   Occupational History    Not on file   Tobacco Use    Smoking status: Every Day     Current packs/day: 0.50     Types: Cigarettes    Smokeless tobacco: Never   Vaping Use    Vaping status:

## 2025-06-10 NOTE — PROGRESS NOTES
Attended     55043 Iontophoresis     27808 Therapeutic Ex 40 3   57342 Therapeutic Activity     89344 Neuromuscular Re-Ed     39734 Manual Therapy 15 1   16874 ADL/COMP Tech Train     70235 Orthotic Management/Training      Other                 Total  55 4       Plan: OT     [x]  Continues Plan of care with focus on ROM/strength: Treatment covered based on POC and graduated to patient's progress. Pt education continues at each visit to obtain maximum benefits from skilled OT intervention.  []  Alter Plan of care:   []  Discharge:      Gwendolyn Starr MS, OTR/L #[id number]

## 2025-06-12 ENCOUNTER — HOSPITAL ENCOUNTER (OUTPATIENT)
Dept: PHYSICAL THERAPY | Age: 75
Setting detail: THERAPIES SERIES
Discharge: HOME | End: 2025-06-12
Attending: ORTHOPAEDIC SURGERY
Payer: MEDICARE

## 2025-06-12 ENCOUNTER — HOSPITAL ENCOUNTER (OUTPATIENT)
Dept: OCCUPATIONAL THERAPY | Age: 75
Setting detail: THERAPIES SERIES
Discharge: HOME | End: 2025-06-12
Attending: ORTHOPAEDIC SURGERY
Payer: MEDICARE

## 2025-06-12 PROCEDURE — 97110 THERAPEUTIC EXERCISES: CPT | Performed by: OCCUPATIONAL THERAPIST

## 2025-06-12 PROCEDURE — 97161 PT EVAL LOW COMPLEX 20 MIN: CPT

## 2025-06-12 PROCEDURE — 97140 MANUAL THERAPY 1/> REGIONS: CPT | Performed by: OCCUPATIONAL THERAPIST

## 2025-06-12 NOTE — PROGRESS NOTES
OCCUPATIONAL THERAPY DAILY NOTE  Y [facility]  JOIDE OCCUPATIONAL THERAPY  [address] [facility] [address]  Dept: [phone]  Loc: [phone]   [facility] OT Fax: [phone]      Date:  2025        Initial Evaluation Date: 2025                            Evaluating Therapist: Gwendolyn Starr OT     Patient Name:  Andrea Cameron                      :  1950     Restrictions/Precautions:  Per Proximal Humerus Fx Protocol - conservative, low fall risk  Diagnosis:  L Proximal Humerus Fx; S42.A (ICD-10-CM) - Closed fracture of proximal end of left humerus, unspecified fracture morphology, initial encounter   Date of Surgery/Injury: 2025 inj 4 weeks post inj      Insurance/Certification information:  [employer]na Medicare  Plan of care signed (Y/N): N  Visit# / total visits:      Referring Practitioner:  Dr. Josias Esquivel MD  Specific Practitioner Orders: Patient with left proximal humerus fracture. Patient with significant edema in left forearm. Work on edema control, gentle range of motion elbow wrist and fingers.  Modalities as needed.      Assessment of current deficits   [x] ADLs          [x] IADLs         [x] Strength      [x] ROM          [x] Pain            [x] Sensation     [x] Fine Motor Coordination    [x] Edema        [x] Motor Endurance [x] Gross Motor Coordination      OT PLAN OF CARE   OT POC based on physician orders, patient diagnosis and results of clinical assessment.     Frequency/Duration: 2-3x/wk for 18 visits  /  Certification Period From: 2025  To: 2025     Specific OT Treatment to include:   [x] Instruction in HEP                   Modalities:  [x] Therapeutic Exercise                 [x] Ultrasound             [x] PROM/Stretching                    [x] Fluidotherapy          [x]  Paraffin                   [x] AAROM  [x] AROM                 [x] Iontophoresis:   [x] Tendon Glides                         [x] Electrical Stimulation/Attended

## 2025-06-17 ENCOUNTER — HOSPITAL ENCOUNTER (OUTPATIENT)
Dept: OCCUPATIONAL THERAPY | Age: 75
Setting detail: THERAPIES SERIES
Discharge: HOME | End: 2025-06-17
Attending: ORTHOPAEDIC SURGERY
Payer: MEDICARE

## 2025-06-17 ENCOUNTER — HOSPITAL ENCOUNTER (OUTPATIENT)
Dept: PHYSICAL THERAPY | Age: 75
Setting detail: THERAPIES SERIES
Discharge: HOME | End: 2025-06-17
Attending: ORTHOPAEDIC SURGERY
Payer: MEDICARE

## 2025-06-17 PROCEDURE — 97110 THERAPEUTIC EXERCISES: CPT

## 2025-06-17 PROCEDURE — 97140 MANUAL THERAPY 1/> REGIONS: CPT

## 2025-06-17 NOTE — PROGRESS NOTES
[facility]  Phone: [phone] Fax: [phone]       Physical Therapy Daily Treatment Note  Date:  2025    Patient Name:  Andrea Cameron    :  1950  MRN: [id number]    Restrictions/Precautions:    Diagnosis:  Impaired Mobility Balance Deficits   Treatment Diagnosis:    Insurance/Certification information:    Referring Physician:    Plan of care signed (Y/N):    Visit# / total visits: 2  Pain level: /10  Time In:  0800      Time Out:   845       Subjective:      Exercises:  Exercise/Equipment Resistance/Repetitions Other comments     Bike 10             Squat  5 reps       Bridge/SLR 5 reps 2 sets             Standing March  10 reps      Lateral walk  4 laps             Walk Ladder  4 laps      Step over/back fwd/lat    5 reps ea bilateral      Standing Hip/Knee ext   10 reps bilateral                                                               Other Therapeutic Activities:      Home Exercise Program:      Manual Treatments:      Modalities:      Timed Code Treatment Minutes:  30    Total Treatment Minutes:  45    Treatment/Activity Tolerance:  [x] Patient tolerated treatment well [] Patient limited by fatigue  [] Patient limited by pain  [] Patient limited by other medical complications  [] Other:     Plan:   [x] Continue per plan of care [] Alter current plan (see comments)  [] Plan of care initiated [] Hold pending MD visit [] Discharge  Plan for Next Session:         Treatment Charges: Mins Units   Initial Evaluation     Re-Evaluation     Ther Exercise         TE 40 2   Manual Therapy     MT     Ther Activities        TA     Gait Training          GT     Neuro Re-education NR     Modalities     Non-Billable Service Time 5    Other     Total Time/Units 45 2     Electronically signed by:  Edwardo Mancini, [place] [id number]

## 2025-06-17 NOTE — PROGRESS NOTES
OCCUPATIONAL THERAPY DAILY NOTE  Y [facility]  JODIE OCCUPATIONAL THERAPY  [address] [facility] [address]  Dept: [phone]  Loc: [phone]   [facility] OT Fax: [phone]      Date:  2025        Initial Evaluation Date: 2025                            Evaluating Therapist: Gwendolyn Starr OT     Patient Name:  Andrea Cameron                      :  1950     Restrictions/Precautions:  Per Proximal Humerus Fx Protocol - conservative, low fall risk  Diagnosis:  L Proximal Humerus Fx; S42.A (ICD-10-CM) - Closed fracture of proximal end of left humerus, unspecified fracture morphology, initial encounter   Date of Surgery/Injury: 2025 inj 4 weeks post inj      Insurance/Certification information:  [employer]na Medicare  Plan of care signed (Y/N): N  Visit# / total visits:      Referring Practitioner:  Dr. Josias Esquivel MD  Specific Practitioner Orders: Patient with left proximal humerus fracture. Patient with significant edema in left forearm. Work on edema control, gentle range of motion elbow wrist and fingers.  Modalities as needed.      Assessment of current deficits   [x] ADLs          [x] IADLs         [x] Strength      [x] ROM          [x] Pain            [x] Sensation     [x] Fine Motor Coordination    [x] Edema        [x] Motor Endurance [x] Gross Motor Coordination      OT PLAN OF CARE   OT POC based on physician orders, patient diagnosis and results of clinical assessment.     Frequency/Duration: 2-3x/wk for 18 visits  /  Certification Period From: 2025  To: 2025     Specific OT Treatment to include:   [x] Instruction in HEP                   Modalities:  [x] Therapeutic Exercise                 [x] Ultrasound             [x] PROM/Stretching                    [x] Fluidotherapy          [x]  Paraffin                   [x] AAROM  [x] AROM                 [x] Iontophoresis:   [x] Tendon Glides                         [x] Electrical Stimulation/Attended

## 2025-06-19 ENCOUNTER — HOSPITAL ENCOUNTER (OUTPATIENT)
Dept: PHYSICAL THERAPY | Age: 75
Setting detail: THERAPIES SERIES
Discharge: HOME | End: 2025-06-19
Attending: ORTHOPAEDIC SURGERY
Payer: MEDICARE

## 2025-06-19 ENCOUNTER — HOSPITAL ENCOUNTER (OUTPATIENT)
Dept: OCCUPATIONAL THERAPY | Age: 75
Setting detail: THERAPIES SERIES
Discharge: HOME | End: 2025-06-19
Attending: ORTHOPAEDIC SURGERY
Payer: MEDICARE

## 2025-06-19 PROCEDURE — 97110 THERAPEUTIC EXERCISES: CPT | Performed by: OCCUPATIONAL THERAPIST

## 2025-06-19 PROCEDURE — 97110 THERAPEUTIC EXERCISES: CPT

## 2025-06-19 PROCEDURE — 97140 MANUAL THERAPY 1/> REGIONS: CPT | Performed by: OCCUPATIONAL THERAPIST

## 2025-06-19 NOTE — PROGRESS NOTES
[facility]  Phone: [phone] Fax: [phone]       Physical Therapy Daily Treatment Note  Date:  2025    Patient Name:  Andrea Cameron    :  1950  MRN: [id number]    Restrictions/Precautions:    Diagnosis:  Impaired Mobility Balance Deficits   Treatment Diagnosis:    Insurance/Certification information:    Referring Physician:    Plan of care signed (Y/N):    Visit# / total visits: 3  Pain level: /10  Time In:  0800      Time Out:   845       Subjective:      Exercises:  Exercise/Equipment Resistance/Repetitions Other comments     Bike 10             Squat  5 reps       Bridge/SLR 5 reps 2 sets             Standing March  10 reps      Lateral walk  4 laps             Walk Ladder  4 laps      Step over/back fwd/lat    5 reps ea bilateral      Standing Hip/Knee ext   10 reps bilateral                                                               Other Therapeutic Activities:      Home Exercise Program:      Manual Treatments:      Modalities:      Timed Code Treatment Minutes:  30    Total Treatment Minutes:  45    Treatment/Activity Tolerance:  [x] Patient tolerated treatment well [] Patient limited by fatigue  [] Patient limited by pain  [] Patient limited by other medical complications  [] Other:     Plan:   [x] Continue per plan of care [] Alter current plan (see comments)  [] Plan of care initiated [] Hold pending MD visit [] Discharge  Plan for Next Session:         Treatment Charges: Mins Units   Initial Evaluation     Re-Evaluation     Ther Exercise         TE 40 2   Manual Therapy     MT     Ther Activities        TA     Gait Training          GT     Neuro Re-education NR     Modalities     Non-Billable Service Time 5    Other     Total Time/Units 45 2     Electronically signed by:  Edwardo Mancini, PT [id number]

## 2025-06-19 NOTE — PROGRESS NOTES
20ccw, 4X/DAY  -AA shoulder flexion seated          Assessment/Comments: D/t the severity of pt's fx and nature of injury - ROM of the shoulder remains difficult to obtain. Despite this, pt is making progress in overall increased functional use of the L UE improving his daily performance with ADL/IADL tasks. Continued OT is recommended at 2x/ week x 4-5 more weeks to further progress with ROM and strength of the L shoulder, with continued attempts to decrease pain and manage symptoms to obtain optimal rehab potential and functionality with use to improve daily activity performance. Potential for further progression is good.      Improved ADL/IADL Completion:   -Pt is now putting dishes away.  -Gets dressed without difficulty.  -Able to push a grocery cart, gather items, and carry bags into the home.  -No difficulty reported with driving or getting in and out of the car.  -Able to vacuum around his home.     Left Upper Extremity ROM /  KEY: Ext/Flex   AROM(PROM)          IE  AROM/  AAROM 6/19      Shoulder Extension: 0/45* 50* 60*        Flexion: 0/180* 15*/45* 46*       Abduction: 0/180* 15*/65* 40*        Internal Rotation: 0/70* 45*  -       External Rotation: 0/90* 22*  10*        Elbow Extension/Flexion: 0/145* -30*/Full  -25*        Manual Muscle Testing (MMT)   /   Grade (0-5/5)    L UE IE 6/19   Shoulder Flexion 2-/5 -   Shoulder Extension 3/5 4-/5   Shoulder Abduction 2-/5 -   Internal Rotation 2+/5 -   External Rotation 2-/5 -   Elbow Flexion 3/5 4/5   Elbow Extension 2+/5 -      Edema Description/Circumferential Measurements: Only slight swelling observed in the distal UE - pt reported significant improvements in swelling since last seeing the physician. There is moderate edema with palpation to the mid- biceps and moderate surrounding the shoulder. -->Slight to none observed throughout the L UE.                Dynamometer (setting 2) IE 6/19      Left 48# 61#     Right 58# 82#     Pinch (lateral)

## 2025-06-24 ENCOUNTER — HOSPITAL ENCOUNTER (OUTPATIENT)
Dept: OCCUPATIONAL THERAPY | Age: 75
Setting detail: THERAPIES SERIES
Discharge: HOME | End: 2025-06-24
Attending: ORTHOPAEDIC SURGERY
Payer: MEDICARE

## 2025-06-24 ENCOUNTER — HOSPITAL ENCOUNTER (OUTPATIENT)
Dept: PHYSICAL THERAPY | Age: 75
Setting detail: THERAPIES SERIES
Discharge: HOME | End: 2025-06-24
Attending: ORTHOPAEDIC SURGERY
Payer: MEDICARE

## 2025-06-24 PROCEDURE — 97110 THERAPEUTIC EXERCISES: CPT | Performed by: OCCUPATIONAL THERAPIST

## 2025-06-24 PROCEDURE — 97110 THERAPEUTIC EXERCISES: CPT

## 2025-06-24 PROCEDURE — 97530 THERAPEUTIC ACTIVITIES: CPT | Performed by: OCCUPATIONAL THERAPIST

## 2025-06-24 NOTE — PROGRESS NOTES
OCCUPATIONAL THERAPY DAILY NOTE  Y [facility]  JODIE OCCUPATIONAL THERAPY  [address] [facility] [address]  Dept: [phone]  Loc: [phone]   [facility] OT Fax: [phone]      Date:  2025        Initial Evaluation Date: 2025                            Evaluating Therapist: Gwendolyn Starr OT     Patient Name:  Andrea Cameron                      :  1950     Restrictions/Precautions:  Per Proximal Humerus Fx Protocol - conservative, low fall risk  Diagnosis:  L Proximal Humerus Fx; S42.A (ICD-10-CM) - Closed fracture of proximal end of left humerus, unspecified fracture morphology, initial encounter   Date of Surgery/Injury: 2025 inj 4 weeks post inj      Insurance/Certification information:  [employer]na Medicare  Plan of care signed (Y/N): N  Visit# / total visits:      Referring Practitioner:  Dr. Josias Esquivel MD  Specific Practitioner Orders: Patient with left proximal humerus fracture. Patient with significant edema in left forearm. Work on edema control, gentle range of motion elbow wrist and fingers.  Modalities as needed.      Assessment of current deficits   [x] ADLs          [x] IADLs         [x] Strength      [x] ROM          [x] Pain            [x] Sensation     [x] Fine Motor Coordination    [x] Edema        [x] Motor Endurance [x] Gross Motor Coordination      OT PLAN OF CARE   OT POC based on physician orders, patient diagnosis and results of clinical assessment.     Frequency/Duration: 2-3x/wk for 18 visits  /  Certification Period From: 2025  To: 2025     Specific OT Treatment to include:   [x] Instruction in HEP                   Modalities:  [x] Therapeutic Exercise                 [x] Ultrasound             [x] PROM/Stretching                    [x] Fluidotherapy          [x]  Paraffin                   [x] AAROM  [x] AROM                 [x] Iontophoresis:   [x] Tendon Glides                         [x] Electrical Stimulation/Attended

## 2025-06-24 NOTE — PROGRESS NOTES
[facility]  Phone: [phone] Fax: [phone]       Physical Therapy Daily Treatment Note  Date:  2025    Patient Name:  Andrea Cameron    :  1950  MRN: [id number]    Restrictions/Precautions:    Diagnosis:  Impaired Mobility Balance Deficits   Treatment Diagnosis:    Insurance/Certification information:    Referring Physician:    Plan of care signed (Y/N):    Visit# / total visits: 4  Pain level: /10  Time In:  0800      Time Out:   845       Subjective:      Exercises:  Exercise/Equipment Resistance/Repetitions Other comments     Bike 10             Squat  5 reps       Bridge/SLR 5 reps 2 sets             Standing March  10 reps      Lateral walk  4 laps             Walk Ladder  4 laps      Step over/back fwd/lat    5 reps ea bilateral      Standing Hip/Knee ext   10 reps bilateral                                                               Other Therapeutic Activities:      Home Exercise Program:      Manual Treatments:      Modalities:      Timed Code Treatment Minutes:  30    Total Treatment Minutes:  45    Treatment/Activity Tolerance:  [x] Patient tolerated treatment well [] Patient limited by fatigue  [] Patient limited by pain  [] Patient limited by other medical complications  [] Other:     Plan:   [x] Continue per plan of care [] Alter current plan (see comments)  [] Plan of care initiated [] Hold pending MD visit [] Discharge  Plan for Next Session:         Treatment Charges: Mins Units   Initial Evaluation     Re-Evaluation     Ther Exercise         TE 40 2   Manual Therapy     MT     Ther Activities        TA     Gait Training          GT     Neuro Re-education NR     Modalities     Non-Billable Service Time 5    Other     Total Time/Units 45 2     Electronically signed by:  Edwardo Mancini, [place] [id number]

## 2025-06-26 ENCOUNTER — HOSPITAL ENCOUNTER (OUTPATIENT)
Dept: OCCUPATIONAL THERAPY | Age: 75
Setting detail: THERAPIES SERIES
Discharge: HOME | End: 2025-06-26
Attending: ORTHOPAEDIC SURGERY
Payer: MEDICARE

## 2025-06-26 ENCOUNTER — HOSPITAL ENCOUNTER (OUTPATIENT)
Dept: PHYSICAL THERAPY | Age: 75
Setting detail: THERAPIES SERIES
Discharge: HOME | End: 2025-06-26
Attending: ORTHOPAEDIC SURGERY
Payer: MEDICARE

## 2025-06-26 NOTE — PROGRESS NOTES
Phone: [phone] Fax: [phone]    Occupational Therapy   Cancellation/No-show Note     Patient Name:  Andrea Cameron  : 1950  Date:  2025  MRN: [id number]    For today's appointment patient:       [x]  Cancelled   []  Rescheduled appointment   []  No-show     Reason given by patient:   []  Patient ill   []  Conflicting appointment   []  No transportation   []  Conflict with work   []  No reason given   [x]  Other:     Comments: Family Emergency. Next scheduled OT session is 25.     Electronically signed by: ELAN Cote/L, MS, #[id number]

## 2025-06-26 NOTE — PROGRESS NOTES
[facility]  Phone: [phone] Fax: [phone]     Physical Therapy  Cancellation/No-show Note  Patient Name:  Andrea Cameron  :  1950   Date:  2025    For today's appointment patient:  [x]  Cancelled  []  Rescheduled appointment  []  No-show     Reason given by patient:  []  Patient ill  []  Conflicting appointment  []  No transportation    []  Conflict with work  []  No reason given  [x]  Other:     Comments:  Family Issue                                    Next scheduled PT session 25    Electronically signed by:  Edwardo Mancini, PT [id number]

## 2025-07-01 ENCOUNTER — HOSPITAL ENCOUNTER (OUTPATIENT)
Dept: PHYSICAL THERAPY | Age: 75
Setting detail: THERAPIES SERIES
End: 2025-07-01
Attending: ORTHOPAEDIC SURGERY
Payer: MEDICARE

## 2025-07-01 ENCOUNTER — HOSPITAL ENCOUNTER (OUTPATIENT)
Dept: OCCUPATIONAL THERAPY | Age: 75
Setting detail: THERAPIES SERIES
Discharge: HOME | End: 2025-07-01
Attending: ORTHOPAEDIC SURGERY
Payer: MEDICARE

## 2025-07-01 NOTE — PROGRESS NOTES
[facility]  Phone: [phone] Fax: [phone]     Physical Therapy  Cancellation/No-show Note  Patient Name:  Andrea Cameron  :  1950   Date:  2025    For today's appointment patient:  [x]  Cancelled  []  Rescheduled appointment  []  No-show     Reason given by patient:  []  Patient ill  []  Conflicting appointment  []  No transportation    []  Conflict with work  []  No reason given  [x]  Other:     Comments:  Family Issue                                    Next scheduled PT session 25    Electronically signed by:  Edwardo Mancini, PT [id number]

## 2025-07-01 NOTE — PROGRESS NOTES
Phone: [phone] Fax: [phone]    Occupational Therapy   Cancellation/No-show Note     Patient Name:  Andrea Cameron  : 1950  Date:  2025  MRN: [id number]    For today's appointment patient:       [x]  Cancelled   []  Rescheduled appointment   []  No-show     Reason given by patient:   []  Patient ill   []  Conflicting appointment   []  No transportation   []  Conflict with work   []  No reason given   [x]  Other:     Comments: Out of town     Electronically signed by: Annia Hinojosa, OTR/L [id number]

## 2025-07-03 ENCOUNTER — HOSPITAL ENCOUNTER (OUTPATIENT)
Dept: OCCUPATIONAL THERAPY | Age: 75
Setting detail: THERAPIES SERIES
Discharge: HOME | End: 2025-07-03
Attending: ORTHOPAEDIC SURGERY
Payer: MEDICARE

## 2025-07-03 ENCOUNTER — HOSPITAL ENCOUNTER (OUTPATIENT)
Dept: PHYSICAL THERAPY | Age: 75
Setting detail: THERAPIES SERIES
Discharge: HOME | End: 2025-07-03
Attending: ORTHOPAEDIC SURGERY
Payer: MEDICARE

## 2025-07-03 PROCEDURE — 97110 THERAPEUTIC EXERCISES: CPT

## 2025-07-03 NOTE — PROGRESS NOTES
[x] ADL/IADL re-training                 [x] Desensitization/Nerve Stimulation     [x] Therapeutic Activity                  [x] Pain Management with/without modalities PRN  [x] Manual Therapy                      [] Splinting                                   [] Scar Management                   [x] Joint Mobilization                                        [x] Manual Edema Mobilization    [x] Myofascial Release                   [x] GM/FM Coordination                [x] Safety retraining/education per individual diagnosis/goals  [x] Neuromuscular Re-Ed            [] Energy Conservation/Work Simplification   []Joint Protection/Training          []Ergonomics     [x] Adaptive Equipment Assessment/Training                                        Patient Specific Goal: To be able to perform all necessary tasks needed to get through the day despite ROM gains in the shoulder. --> Progressing very well.                              GOALS (Long term same as Short term): Updated 6/19/25  1) Patient will demonstrate good understanding of home program (exercises/activities/diagnosis/prognosis/goals) with % compliance.   Progressing, exercises issued for HEP but questionable compliance to program.   2) Patient will demonstrate increased active/passive range of motion of their L elbow extension to -15* or less to improve safety with functional reach.   Progressing well with decrease from -30* to -25* extension.  3) Patient will demonstrate increased active/passive range of motion of their L shoulder by 20* or more in flex/abd/ER to improve ability to wash the back of his head.  Shoulder ext progressed to WFLs. No progress in ROM of all other planes of the shoulder but pt was reporting increased tightness today due to the changes in the weather lately. Despite ROM limitations in the shoulder, overall functional use of the UE has improve per pt report.  4) Patient will demonstrate increased strength throughout

## 2025-07-03 NOTE — PROGRESS NOTES
[facility]  Phone: [phone] Fax: [phone]       Physical Therapy Daily Treatment Note  Date:  7/3/2025    Patient Name:  Andrea Cameron    :  1950  MRN: [id number]    Restrictions/Precautions:    Diagnosis:  Impaired Mobility Balance Deficits   Treatment Diagnosis:    Insurance/Certification information:    Referring Physician:    Plan of care signed (Y/N):    Visit# / total visits: 5  Pain level: /10  Time In:  0800      Time Out:   845       Subjective:      Exercises:  Exercise/Equipment Resistance/Repetitions Other comments     Bike 10             Squat  5 reps       Bridge/SLR 5 reps 2 sets             Standing March  10 reps      Lateral walk  4 laps             Walk Ladder  4 laps      Step over/back fwd/lat    5 reps ea bilateral      Standing Hip/Knee ext   10 reps bilateral                                                               Other Therapeutic Activities:      Home Exercise Program:      Manual Treatments:      Modalities:      Timed Code Treatment Minutes:  30    Total Treatment Minutes:  45    Treatment/Activity Tolerance:  [x] Patient tolerated treatment well [] Patient limited by fatigue  [] Patient limited by pain  [] Patient limited by other medical complications  [] Other:     Plan:   [x] Continue per plan of care [] Alter current plan (see comments)  [] Plan of care initiated [] Hold pending MD visit [] Discharge  Plan for Next Session:         Treatment Charges: Mins Units   Initial Evaluation     Re-Evaluation     Ther Exercise         TE 40 2   Manual Therapy     MT     Ther Activities        TA     Gait Training          GT     Neuro Re-education NR     Modalities     Non-Billable Service Time 5    Other     Total Time/Units 45 2     Electronically signed by:  Edwardo Mancini, [place] [id number]

## 2025-07-08 ENCOUNTER — HOSPITAL ENCOUNTER (OUTPATIENT)
Dept: PHYSICAL THERAPY | Age: 75
Setting detail: THERAPIES SERIES
Discharge: HOME | End: 2025-07-08
Attending: ORTHOPAEDIC SURGERY
Payer: MEDICARE

## 2025-07-08 PROCEDURE — 97110 THERAPEUTIC EXERCISES: CPT

## 2025-07-08 NOTE — PROGRESS NOTES
[facility]  Phone: [phone] Fax: [phone]       Physical Therapy Daily Treatment Note  Date:  2025    Patient Name:  Andrea Cameron    :  1950  MRN: [id number]    Restrictions/Precautions:    Diagnosis:  Impaired Mobility Balance Deficits   Treatment Diagnosis:    Insurance/Certification information:    Referring Physician:    Plan of care signed (Y/N):    Visit# / total visits:   Pain level: /10  Time In:  08      Time Out:   845       Subjective:      Exercises:  Exercise/Equipment Resistance/Repetitions Other comments     Bike 10             Squat  5 reps       Bridge/SLR 5 reps 2 sets             Standing March  10 reps      Lateral walk  4 laps             Walk Ladder  4 laps      Step over/back fwd/lat    5 reps ea bilateral      Standing Hip/Knee ext   10 reps bilateral                                                               Other Therapeutic Activities:      Home Exercise Program:      Manual Treatments:      Modalities:      Timed Code Treatment Minutes:  30    Total Treatment Minutes:  45    Treatment/Activity Tolerance:  [x] Patient tolerated treatment well [] Patient limited by fatigue  [] Patient limited by pain  [] Patient limited by other medical complications  [] Other:     Plan:   [x] Continue per plan of care [] Alter current plan (see comments)  [] Plan of care initiated [] Hold pending MD visit [] Discharge  Plan for Next Session:         Treatment Charges: Mins Units   Initial Evaluation     Re-Evaluation     Ther Exercise         TE 40 2   Manual Therapy     MT     Ther Activities        TA     Gait Training          GT     Neuro Re-education NR     Modalities     Non-Billable Service Time 5    Other     Total Time/Units 45 2     Electronically signed by:  Edwardo Mancini, PT [id number]

## 2025-07-10 ENCOUNTER — HOSPITAL ENCOUNTER (OUTPATIENT)
Dept: OCCUPATIONAL THERAPY | Age: 75
Setting detail: THERAPIES SERIES
Discharge: HOME | End: 2025-07-10
Attending: ORTHOPAEDIC SURGERY
Payer: MEDICARE

## 2025-07-10 ENCOUNTER — HOSPITAL ENCOUNTER (OUTPATIENT)
Dept: PHYSICAL THERAPY | Age: 75
Setting detail: THERAPIES SERIES
Discharge: HOME | End: 2025-07-10
Attending: ORTHOPAEDIC SURGERY
Payer: MEDICARE

## 2025-07-10 PROCEDURE — 97110 THERAPEUTIC EXERCISES: CPT

## 2025-07-10 NOTE — PROGRESS NOTES
[facility]  Phone: [phone] Fax: [phone]       Physical Therapy Daily Treatment Note  Date:  7/10/2025    Patient Name:  Andrea Cameron    :  1950  MRN: [id number]    Restrictions/Precautions:    Diagnosis:  Impaired Mobility Balance Deficits   Treatment Diagnosis:    Insurance/Certification information:    Referring Physician:    Plan of care signed (Y/N):    Visit# / total visits:   Pain level: /10  Time In:  08      Time Out:   845       Subjective:      Exercises:  Exercise/Equipment Resistance/Repetitions Other comments     Bike 10             Squat  5 reps       Bridge/SLR 5 reps 2 sets             Standing March  10 reps      Lateral walk  4 laps             Walk Ladder  4 laps      Step over/back fwd/lat    5 reps ea bilateral      Standing Hip/Knee ext   10 reps bilateral                                                               Other Therapeutic Activities:      Home Exercise Program:      Manual Treatments:      Modalities:      Timed Code Treatment Minutes:  30    Total Treatment Minutes:  45    Treatment/Activity Tolerance:  [x] Patient tolerated treatment well [] Patient limited by fatigue  [] Patient limited by pain  [] Patient limited by other medical complications  [] Other:     Plan:   [x] Continue per plan of care [] Alter current plan (see comments)  [] Plan of care initiated [] Hold pending MD visit [] Discharge  Plan for Next Session:         Treatment Charges: Mins Units   Initial Evaluation     Re-Evaluation     Ther Exercise         TE 40 2   Manual Therapy     MT     Ther Activities        TA     Gait Training          GT     Neuro Re-education NR     Modalities     Non-Billable Service Time 5    Other     Total Time/Units 45 2     Electronically signed by:  Edwardo Mancini, PT [id number]

## 2025-07-10 NOTE — PROGRESS NOTES
OCCUPATIONAL THERAPY DAILY NOTE  [facility]  [facility]  [address] [address]  Dept: [phone]  Loc: [phone]   [facility] OT Fax: [phone]      Date:  7/10/2025        Initial Evaluation Date: 2025                            Evaluating Therapist: Gwendolyn Starr OT     Patient Name:  Andrea Cameron                      :  1950     Restrictions/Precautions:  Per Proximal Humerus Fx Protocol - conservative, low fall risk  Diagnosis:  L Proximal Humerus Fx; S42.A (ICD-10-CM) - Closed fracture of proximal end of left humerus, unspecified fracture morphology, initial encounter   Date of Surgery/Injury: 2025 inj 4 weeks post inj      Insurance/Certification information:  [employer] Medicare  Plan of care signed (Y/N): N  Visit# / total visits:      Referring Practitioner:  Dr. Josias Esquivel MD  Specific Practitioner Orders: Patient with left proximal humerus fracture. Patient with significant edema in left forearm. Work on edema control, gentle range of motion elbow wrist and fingers.  Modalities as needed.      Assessment of current deficits   [x] ADLs          [x] IADLs         [x] Strength      [x] ROM          [x] Pain            [x] Sensation     [x] Fine Motor Coordination    [x] Edema        [x] Motor Endurance [x] Gross Motor Coordination      OT PLAN OF CARE   OT POC based on physician orders, patient diagnosis and results of clinical assessment.     Frequency/Duration: 2-3x/wk for 18 visits  /  Certification Period From: 2025  To: 2025     Specific OT Treatment to include:   [x] Instruction in HEP                   Modalities:  [x] Therapeutic Exercise                 [x] Ultrasound             [x] PROM/Stretching                    [x] Fluidotherapy          [x]  Paraffin                   [x] AAROM  [x] AROM                 [x] Iontophoresis:   [x] Tendon Glides                         [x] Electrical Stimulation/Attended

## 2025-07-15 ENCOUNTER — HOSPITAL ENCOUNTER (OUTPATIENT)
Dept: PHYSICAL THERAPY | Age: 75
Setting detail: THERAPIES SERIES
Discharge: HOME | End: 2025-07-15
Attending: ORTHOPAEDIC SURGERY
Payer: MEDICARE

## 2025-07-15 ENCOUNTER — HOSPITAL ENCOUNTER (OUTPATIENT)
Dept: OCCUPATIONAL THERAPY | Age: 75
Setting detail: THERAPIES SERIES
Discharge: HOME | End: 2025-07-15
Attending: ORTHOPAEDIC SURGERY
Payer: MEDICARE

## 2025-07-15 PROCEDURE — 97110 THERAPEUTIC EXERCISES: CPT

## 2025-07-15 NOTE — PROGRESS NOTES
Stimulation/Attended                    [x] ADL/IADL re-training                 [x] Desensitization/Nerve Stimulation     [x] Therapeutic Activity                  [x] Pain Management with/without modalities PRN  [x] Manual Therapy                      [] Splinting                                   [] Scar Management                   [x] Joint Mobilization                                        [x] Manual Edema Mobilization    [x] Myofascial Release                   [x] GM/FM Coordination                [x] Safety retraining/education per individual diagnosis/goals  [x] Neuromuscular Re-Ed            [] Energy Conservation/Work Simplification   []Joint Protection/Training          []Ergonomics     [x] Adaptive Equipment Assessment/Training                                        Patient Specific Goal: To be able to perform all necessary tasks needed to get through the day despite ROM gains in the shoulder. --> Progressing very well.                              GOALS (Long term same as Short term): Updated 6/19/25  1) Patient will demonstrate good understanding of home program (exercises/activities/diagnosis/prognosis/goals) with % compliance.   Progressing, exercises issued for HEP but questionable compliance to program.   2) Patient will demonstrate increased active/passive range of motion of their L elbow extension to -15* or less to improve safety with functional reach.   Progressing well with decrease from -30* to -25* extension.  3) Patient will demonstrate increased active/passive range of motion of their L shoulder by 20* or more in flex/abd/ER to improve ability to wash the back of his head.  Shoulder ext progressed to WFLs. No progress in ROM of all other planes of the shoulder but pt was reporting increased tightness today due to the changes in the weather lately. Despite ROM limitations in the shoulder, overall functional use of the UE has improve per pt report.  4) Patient will demonstrate

## 2025-07-15 NOTE — PROGRESS NOTES
[facility]  Phone: [phone] Fax: [phone]       Physical Therapy Daily Treatment Note  Date:  7/15/2025    Patient Name:  Andrea Cameron    :  1950  MRN: [id number]    Restrictions/Precautions:    Diagnosis:  Impaired Mobility Balance Deficits   Treatment Diagnosis:    Insurance/Certification information:    Referring Physician:    Plan of care signed (Y/N):    Visit# / total visits: 10/12  Pain level: /10  Time In:  08      Time Out:   845       Subjective:      Exercises:  Exercise/Equipment Resistance/Repetitions Other comments     Bike 10             Squat  5 reps       Bridge/SLR 5 reps 2 sets             Standing March  10 reps      Lateral walk  4 laps             Walk Ladder  4 laps      Step over/back fwd/lat    5 reps ea bilateral      Standing Hip/Knee ext   10 reps bilateral                                                               Other Therapeutic Activities:      Home Exercise Program:      Manual Treatments:      Modalities:      Timed Code Treatment Minutes:  30    Total Treatment Minutes:  45    Treatment/Activity Tolerance:  [x] Patient tolerated treatment well [] Patient limited by fatigue  [] Patient limited by pain  [] Patient limited by other medical complications  [] Other:     Plan:   [x] Continue per plan of care [] Alter current plan (see comments)  [] Plan of care initiated [] Hold pending MD visit [] Discharge  Plan for Next Session:         Treatment Charges: Mins Units   Initial Evaluation     Re-Evaluation     Ther Exercise         TE 40 2   Manual Therapy     MT     Ther Activities        TA     Gait Training          GT     Neuro Re-education NR     Modalities     Non-Billable Service Time 5    Other     Total Time/Units 45 2     Electronically signed by:  Edwardo Mancini, [place] [id number]

## 2025-07-16 ENCOUNTER — APPOINTMENT (OUTPATIENT)
Dept: OCCUPATIONAL THERAPY | Age: 75
End: 2025-07-16
Attending: ORTHOPAEDIC SURGERY
Payer: MEDICARE

## 2025-07-17 ENCOUNTER — HOSPITAL ENCOUNTER (OUTPATIENT)
Dept: PHYSICAL THERAPY | Age: 75
Setting detail: THERAPIES SERIES
Discharge: HOME | End: 2025-07-17
Attending: ORTHOPAEDIC SURGERY
Payer: MEDICARE

## 2025-07-17 ENCOUNTER — HOSPITAL ENCOUNTER (OUTPATIENT)
Dept: OCCUPATIONAL THERAPY | Age: 75
Setting detail: THERAPIES SERIES
Discharge: HOME | End: 2025-07-17
Attending: ORTHOPAEDIC SURGERY
Payer: MEDICARE

## 2025-07-17 PROCEDURE — 97110 THERAPEUTIC EXERCISES: CPT

## 2025-07-17 NOTE — PROGRESS NOTES
[facility]  Phone: [phone] Fax: [phone]       Physical Therapy Daily Treatment Note  Date:  2025    Patient Name:  Andrea Cameron    :  1950  MRN: [id number]    Restrictions/Precautions:    Diagnosis:  Impaired Mobility Balance Deficits   Treatment Diagnosis:    Insurance/Certification information:    Referring Physician:    Plan of care signed (Y/N):    Visit# / total visits:   Pain level: /10  Time In:  08      Time Out:   845       Subjective:      Exercises:  Exercise/Equipment Resistance/Repetitions Other comments     Bike 10             Squat  5 reps       Bridge/SLR 5 reps 2 sets             Standing March  10 reps      Lateral walk  4 laps             Walk Ladder  4 laps      Step over/back fwd/lat    5 reps ea bilateral      Standing Hip/Knee ext   10 reps bilateral                                                               Other Therapeutic Activities:      Home Exercise Program:      Manual Treatments:      Modalities:      Timed Code Treatment Minutes:  30    Total Treatment Minutes:  45    Treatment/Activity Tolerance:  [x] Patient tolerated treatment well [] Patient limited by fatigue  [] Patient limited by pain  [] Patient limited by other medical complications  [] Other:     Plan:   [x] Continue per plan of care [] Alter current plan (see comments)  [] Plan of care initiated [] Hold pending MD visit [] Discharge  Plan for Next Session:         Treatment Charges: Mins Units   Initial Evaluation     Re-Evaluation     Ther Exercise         TE 40 2   Manual Therapy     MT     Ther Activities        TA     Gait Training          GT     Neuro Re-education NR     Modalities     Non-Billable Service Time 5    Other     Total Time/Units 45 2     Electronically signed by:  Edwardo Mancini, PT [id number]

## 2025-07-17 NOTE — PROGRESS NOTES
Phone: [phone] Fax: [phone]    Occupational Therapy   Cancellation/No-show Note     Patient Name:  Andrea Cameron  : 1950  Date:  2025  MRN: [id number]    For today's appointment patient:       [x]  Cancelled   []  Rescheduled appointment   []  No-show     Reason given by patient:   []  Patient ill   [x]  Conflicting appointment   []  No transportation   []  Conflict with work   []  No reason given   []  Other:     Comments: Pt arrives following PT reporting he needs to leave by 9:30 for an eye appt he had scheduled for 6 mo. Pt wished to cx today's session as he wishes to have an hour long session versus a 30 minute session. Did monitor pt on arm bike for x 10 min for shoulder stiffness prior to him leaving. Will reschedule appt.    Electronically signed by: ELAN Cote/L, [place], #[id number]

## 2025-07-22 ENCOUNTER — HOSPITAL ENCOUNTER (OUTPATIENT)
Dept: OCCUPATIONAL THERAPY | Age: 75
Setting detail: THERAPIES SERIES
Discharge: HOME | End: 2025-07-22
Attending: ORTHOPAEDIC SURGERY
Payer: MEDICARE

## 2025-07-22 ENCOUNTER — HOSPITAL ENCOUNTER (OUTPATIENT)
Dept: PHYSICAL THERAPY | Age: 75
Setting detail: THERAPIES SERIES
Discharge: HOME | End: 2025-07-22
Attending: ORTHOPAEDIC SURGERY
Payer: MEDICARE

## 2025-07-22 PROCEDURE — 97110 THERAPEUTIC EXERCISES: CPT | Performed by: OCCUPATIONAL THERAPIST

## 2025-07-22 PROCEDURE — 97110 THERAPEUTIC EXERCISES: CPT

## 2025-07-22 NOTE — PROGRESS NOTES
OCCUPATIONAL THERAPY DAILY NOTE/DISCHARGE SUMMARY  [facility]  [facility]  [address] [facility] [address]  Dept: [phone]  Loc: [phone]   [facility] OT Fax: [phone]      Date:  2025        Initial Evaluation Date: 2025                            Evaluating Therapist: Gwendolyn Starr OT     Patient Name:  Andrea Cameron                      :  1950     Restrictions/Precautions:  Per Proximal Humerus Fx Protocol - conservative, low fall risk  Diagnosis:  L Proximal Humerus Fx; S42.A (ICD-10-CM) - Closed fracture of proximal end of left humerus, unspecified fracture morphology, initial encounter   Date of Surgery/Injury: 2025 inj 11 wks post injury     Insurance/Certification information:  [employer] Medicare  Plan of care signed (Y/N): Yes- elec cosign   Visit# / total visits: 15 / 18     Referring Practitioner:  Dr. Josias Esquivel MD  Specific Practitioner Orders: Patient with left proximal humerus fracture. Patient with significant edema in left forearm. Work on edema control, gentle range of motion elbow wrist and fingers.  Modalities as needed.      Assessment of current deficits   [x] ADLs          [x] IADLs         [x] Strength      [x] ROM          [x] Pain            [x] Sensation     [x] Fine Motor Coordination    [x] Edema        [x] Motor Endurance [x] Gross Motor Coordination      OT PLAN OF CARE   OT POC based on physician orders, patient diagnosis and results of clinical assessment.     Frequency/Duration: 2-3x/wk for 18 visits  /  Certification Period From: 2025  To: 2025     Specific OT Treatment to include:   [x] Instruction in HEP                   Modalities:  [x] Therapeutic Exercise                 [x] Ultrasound             [x] PROM/Stretching                    [x] Fluidotherapy          [x]  Paraffin                   [x] AAROM  [x] AROM                 [x] Iontophoresis:   [x] Tendon Glides

## 2025-07-24 ENCOUNTER — APPOINTMENT (OUTPATIENT)
Dept: OCCUPATIONAL THERAPY | Age: 75
End: 2025-07-24
Attending: ORTHOPAEDIC SURGERY
Payer: MEDICARE

## 2025-07-29 ENCOUNTER — APPOINTMENT (OUTPATIENT)
Dept: OCCUPATIONAL THERAPY | Age: 75
End: 2025-07-29
Attending: ORTHOPAEDIC SURGERY
Payer: MEDICARE

## 2025-07-31 ENCOUNTER — APPOINTMENT (OUTPATIENT)
Dept: OCCUPATIONAL THERAPY | Age: 75
End: 2025-07-31
Attending: ORTHOPAEDIC SURGERY
Payer: MEDICARE